# Patient Record
Sex: MALE | Race: WHITE | NOT HISPANIC OR LATINO | Employment: FULL TIME | URBAN - METROPOLITAN AREA
[De-identification: names, ages, dates, MRNs, and addresses within clinical notes are randomized per-mention and may not be internally consistent; named-entity substitution may affect disease eponyms.]

---

## 2019-02-15 ENCOUNTER — OFFICE VISIT - HEALTHEAST (OUTPATIENT)
Dept: FAMILY MEDICINE | Facility: CLINIC | Age: 49
End: 2019-02-15

## 2019-02-15 DIAGNOSIS — R27.8: ICD-10-CM

## 2019-02-15 DIAGNOSIS — R79.89 INCREASED AMMONIA LEVEL: ICD-10-CM

## 2019-02-15 DIAGNOSIS — R03.0 ELEVATED BLOOD PRESSURE READING WITHOUT DIAGNOSIS OF HYPERTENSION: ICD-10-CM

## 2019-02-15 DIAGNOSIS — Z78.9 ALCOHOL USE: ICD-10-CM

## 2019-02-15 LAB
ALBUMIN SERPL-MCNC: 5.2 G/DL (ref 3.5–5)
ALP SERPL-CCNC: 70 U/L (ref 45–120)
ALT SERPL W P-5'-P-CCNC: 26 U/L (ref 0–45)
AMMONIA PLAS-SCNC: 46 UMOL/L (ref 11–35)
AMPHETAMINES UR QL SCN: NORMAL
ANION GAP SERPL CALCULATED.3IONS-SCNC: 17 MMOL/L (ref 5–18)
AST SERPL W P-5'-P-CCNC: 36 U/L (ref 0–40)
BARBITURATES UR QL: NORMAL
BASOPHILS # BLD AUTO: 0 THOU/UL (ref 0–0.2)
BASOPHILS NFR BLD AUTO: 1 % (ref 0–2)
BENZODIAZ UR QL: NORMAL
BILIRUB SERPL-MCNC: 3.2 MG/DL (ref 0–1)
BUN SERPL-MCNC: 15 MG/DL (ref 8–22)
CALCIUM SERPL-MCNC: 11.1 MG/DL (ref 8.5–10.5)
CANNABINOIDS UR QL SCN: NORMAL
CHLORIDE BLD-SCNC: 92 MMOL/L (ref 98–107)
CO2 SERPL-SCNC: 27 MMOL/L (ref 22–31)
COCAINE UR QL: NORMAL
CREAT SERPL-MCNC: 0.78 MG/DL (ref 0.7–1.3)
CREAT UR-MCNC: 242 MG/DL
EOSINOPHIL # BLD AUTO: 0.1 THOU/UL (ref 0–0.4)
EOSINOPHIL NFR BLD AUTO: 1 % (ref 0–6)
ERYTHROCYTE [DISTWIDTH] IN BLOOD BY AUTOMATED COUNT: 12.7 % (ref 11–14.5)
ETHANOL UR CFM-MCNC: <10 MG/DL
GFR SERPL CREATININE-BSD FRML MDRD: >60 ML/MIN/1.73M2
GGT SERPL-CCNC: 135 U/L (ref 0–50)
GLUCOSE BLD-MCNC: 149 MG/DL (ref 70–125)
HCT VFR BLD AUTO: 47.9 % (ref 40–54)
HGB BLD-MCNC: 16.1 G/DL (ref 14–18)
INR PPP: 0.99 (ref 0.9–1.1)
LYMPHOCYTES # BLD AUTO: 1.1 THOU/UL (ref 0.8–4.4)
LYMPHOCYTES NFR BLD AUTO: 13 % (ref 20–40)
MCH RBC QN AUTO: 31.3 PG (ref 27–34)
MCHC RBC AUTO-ENTMCNC: 33.7 G/DL (ref 32–36)
MCV RBC AUTO: 93 FL (ref 80–100)
METHADONE UR QL SCN: NORMAL
MONOCYTES # BLD AUTO: 0.9 THOU/UL (ref 0–0.9)
MONOCYTES NFR BLD AUTO: 11 % (ref 2–10)
NEUTROPHILS # BLD AUTO: 6.1 THOU/UL (ref 2–7.7)
NEUTROPHILS NFR BLD AUTO: 74 % (ref 50–70)
OPIATES UR QL SCN: NORMAL
OXYCODONE UR QL: NORMAL
PCP UR QL SCN: NORMAL
PLATELET # BLD AUTO: 193 THOU/UL (ref 140–440)
PMV BLD AUTO: 7.2 FL (ref 7–10)
POTASSIUM BLD-SCNC: 3.4 MMOL/L (ref 3.5–5)
PROT SERPL-MCNC: 8.3 G/DL (ref 6–8)
RBC # BLD AUTO: 5.15 MILL/UL (ref 4.4–6.2)
SODIUM SERPL-SCNC: 136 MMOL/L (ref 136–145)
TSH SERPL DL<=0.005 MIU/L-ACNC: 3.85 UIU/ML (ref 0.3–5)
WBC: 8.2 THOU/UL (ref 4–11)

## 2019-02-15 ASSESSMENT — MIFFLIN-ST. JEOR: SCORE: 1830.35

## 2019-02-18 ENCOUNTER — COMMUNICATION - HEALTHEAST (OUTPATIENT)
Dept: FAMILY MEDICINE | Facility: CLINIC | Age: 49
End: 2019-02-18

## 2019-02-20 ENCOUNTER — OFFICE VISIT - HEALTHEAST (OUTPATIENT)
Dept: ADDICTION MEDICINE | Facility: CLINIC | Age: 49
End: 2019-02-20

## 2019-02-20 DIAGNOSIS — F10.20 ALCOHOL USE DISORDER, SEVERE, DEPENDENCE (H): ICD-10-CM

## 2019-03-01 ENCOUNTER — OFFICE VISIT - HEALTHEAST (OUTPATIENT)
Dept: FAMILY MEDICINE | Facility: CLINIC | Age: 49
End: 2019-03-01

## 2019-03-01 ENCOUNTER — COMMUNICATION - HEALTHEAST (OUTPATIENT)
Dept: ADDICTION MEDICINE | Facility: CLINIC | Age: 49
End: 2019-03-01

## 2019-03-01 DIAGNOSIS — F10.20 MODERATE ALCOHOL USE DISORDER (H): ICD-10-CM

## 2019-03-01 DIAGNOSIS — G47.00 PERSISTENT INSOMNIA: ICD-10-CM

## 2019-03-01 ASSESSMENT — MIFFLIN-ST. JEOR: SCORE: 1843.43

## 2019-03-04 ENCOUNTER — COMMUNICATION - HEALTHEAST (OUTPATIENT)
Dept: FAMILY MEDICINE | Facility: CLINIC | Age: 49
End: 2019-03-04

## 2019-03-20 ENCOUNTER — COMMUNICATION - HEALTHEAST (OUTPATIENT)
Dept: FAMILY MEDICINE | Facility: CLINIC | Age: 49
End: 2019-03-20

## 2019-03-20 ENCOUNTER — TRANSFERRED RECORDS (OUTPATIENT)
Dept: HEALTH INFORMATION MANAGEMENT | Facility: CLINIC | Age: 49
End: 2019-03-20

## 2019-03-20 ENCOUNTER — RECORDS - HEALTHEAST (OUTPATIENT)
Dept: ADMINISTRATIVE | Facility: OTHER | Age: 49
End: 2019-03-20

## 2019-03-20 LAB — HEP C HIM: NORMAL

## 2019-03-22 ENCOUNTER — OFFICE VISIT - HEALTHEAST (OUTPATIENT)
Dept: FAMILY MEDICINE | Facility: CLINIC | Age: 49
End: 2019-03-22

## 2019-03-22 DIAGNOSIS — F10.20 MODERATE ALCOHOL USE DISORDER (H): ICD-10-CM

## 2019-03-22 DIAGNOSIS — R79.89 INCREASED AMMONIA LEVEL: ICD-10-CM

## 2019-03-22 DIAGNOSIS — R74.8 ABNORMAL GGT TEST: ICD-10-CM

## 2019-03-22 DIAGNOSIS — E83.52 CALCIUM BLOOD INCREASED: ICD-10-CM

## 2019-03-22 DIAGNOSIS — R17 INCREASED BILIRUBIN LEVEL: ICD-10-CM

## 2019-03-22 DIAGNOSIS — R73.09 ELEVATED GLUCOSE: ICD-10-CM

## 2019-05-07 ENCOUNTER — RECORDS - HEALTHEAST (OUTPATIENT)
Dept: ADMINISTRATIVE | Facility: OTHER | Age: 49
End: 2019-05-07

## 2019-05-07 ENCOUNTER — MEDICAL CORRESPONDENCE (OUTPATIENT)
Dept: HEALTH INFORMATION MANAGEMENT | Facility: CLINIC | Age: 49
End: 2019-05-07

## 2019-05-07 ENCOUNTER — TRANSFERRED RECORDS (OUTPATIENT)
Dept: HEALTH INFORMATION MANAGEMENT | Facility: CLINIC | Age: 49
End: 2019-05-07

## 2019-05-08 ENCOUNTER — TRANSFERRED RECORDS (OUTPATIENT)
Dept: HEALTH INFORMATION MANAGEMENT | Facility: CLINIC | Age: 49
End: 2019-05-08

## 2019-06-13 ENCOUNTER — TRANSFERRED RECORDS (OUTPATIENT)
Dept: HEALTH INFORMATION MANAGEMENT | Facility: CLINIC | Age: 49
End: 2019-06-13

## 2019-06-13 ENCOUNTER — RECORDS - HEALTHEAST (OUTPATIENT)
Dept: ADMINISTRATIVE | Facility: OTHER | Age: 49
End: 2019-06-13

## 2019-06-17 ENCOUNTER — RECORDS - HEALTHEAST (OUTPATIENT)
Dept: ADMINISTRATIVE | Facility: OTHER | Age: 49
End: 2019-06-17

## 2019-06-17 ENCOUNTER — TRANSFERRED RECORDS (OUTPATIENT)
Dept: HEALTH INFORMATION MANAGEMENT | Facility: CLINIC | Age: 49
End: 2019-06-17

## 2019-09-04 ENCOUNTER — OFFICE VISIT - HEALTHEAST (OUTPATIENT)
Dept: FAMILY MEDICINE | Facility: CLINIC | Age: 49
End: 2019-09-04

## 2019-09-04 DIAGNOSIS — Z12.5 SCREENING PSA (PROSTATE SPECIFIC ANTIGEN): ICD-10-CM

## 2019-09-04 DIAGNOSIS — E83.52 HYPERCALCEMIA: ICD-10-CM

## 2019-09-04 DIAGNOSIS — M77.9 TENDONITIS: ICD-10-CM

## 2019-09-04 DIAGNOSIS — R00.0 TACHYCARDIA: ICD-10-CM

## 2019-09-04 ASSESSMENT — MIFFLIN-ST. JEOR: SCORE: 1901.11

## 2019-09-04 ASSESSMENT — PATIENT HEALTH QUESTIONNAIRE - PHQ9: SUM OF ALL RESPONSES TO PHQ QUESTIONS 1-9: 5

## 2019-09-26 ENCOUNTER — TRANSFERRED RECORDS (OUTPATIENT)
Dept: HEALTH INFORMATION MANAGEMENT | Facility: CLINIC | Age: 49
End: 2019-09-26

## 2019-09-26 ENCOUNTER — RECORDS - HEALTHEAST (OUTPATIENT)
Dept: ADMINISTRATIVE | Facility: OTHER | Age: 49
End: 2019-09-26

## 2019-09-27 ENCOUNTER — TRANSFERRED RECORDS (OUTPATIENT)
Dept: HEALTH INFORMATION MANAGEMENT | Facility: CLINIC | Age: 49
End: 2019-09-27

## 2019-09-27 ENCOUNTER — RECORDS - HEALTHEAST (OUTPATIENT)
Dept: ADMINISTRATIVE | Facility: OTHER | Age: 49
End: 2019-09-27

## 2019-10-01 ENCOUNTER — TRANSFERRED RECORDS (OUTPATIENT)
Dept: HEALTH INFORMATION MANAGEMENT | Facility: CLINIC | Age: 49
End: 2019-10-01

## 2019-10-04 ENCOUNTER — COMMUNICATION - HEALTHEAST (OUTPATIENT)
Dept: LAB | Facility: CLINIC | Age: 49
End: 2019-10-04

## 2019-10-04 ENCOUNTER — AMBULATORY - HEALTHEAST (OUTPATIENT)
Dept: LAB | Facility: CLINIC | Age: 49
End: 2019-10-04

## 2019-10-04 DIAGNOSIS — Z12.5 SCREENING PSA (PROSTATE SPECIFIC ANTIGEN): ICD-10-CM

## 2019-10-04 DIAGNOSIS — R79.89 INCREASED AMMONIA LEVEL: ICD-10-CM

## 2019-10-04 DIAGNOSIS — M77.9 TENDONITIS: ICD-10-CM

## 2019-10-04 DIAGNOSIS — E83.52 HYPERCALCEMIA: ICD-10-CM

## 2019-10-04 DIAGNOSIS — R00.0 TACHYCARDIA: ICD-10-CM

## 2019-10-04 LAB
ALBUMIN SERPL-MCNC: 4.5 G/DL (ref 3.5–5)
ALP SERPL-CCNC: 66 U/L (ref 45–120)
ALT SERPL W P-5'-P-CCNC: 18 U/L (ref 0–45)
ANION GAP SERPL CALCULATED.3IONS-SCNC: 9 MMOL/L (ref 5–18)
AST SERPL W P-5'-P-CCNC: 15 U/L (ref 0–40)
BILIRUB SERPL-MCNC: 0.5 MG/DL (ref 0–1)
BUN SERPL-MCNC: 13 MG/DL (ref 8–22)
C REACTIVE PROTEIN LHE: 0.5 MG/DL (ref 0–0.8)
CALCIUM SERPL-MCNC: 10.3 MG/DL (ref 8.5–10.5)
CALCIUM, IONIZED MEASURED: 1.28 MMOL/L (ref 1.11–1.3)
CHLORIDE BLD-SCNC: 107 MMOL/L (ref 98–107)
CO2 SERPL-SCNC: 28 MMOL/L (ref 22–31)
CREAT SERPL-MCNC: 0.75 MG/DL (ref 0.7–1.3)
ERYTHROCYTE [SEDIMENTATION RATE] IN BLOOD BY WESTERGREN METHOD: 18 MM/HR (ref 0–15)
GFR SERPL CREATININE-BSD FRML MDRD: >60 ML/MIN/1.73M2
GLUCOSE BLD-MCNC: 90 MG/DL (ref 70–125)
ION CA PH 7.4: 1.22 MMOL/L (ref 1.11–1.3)
IRON SERPL-MCNC: 84 UG/DL (ref 42–175)
PH: 7.29 (ref 7.35–7.45)
POTASSIUM BLD-SCNC: 4.1 MMOL/L (ref 3.5–5)
PROT SERPL-MCNC: 7.3 G/DL (ref 6–8)
PSA SERPL-MCNC: 0.4 NG/ML (ref 0–3.5)
RHEUMATOID FACT SERPL-ACNC: <15 IU/ML (ref 0–30)
SODIUM SERPL-SCNC: 144 MMOL/L (ref 136–145)
TSH SERPL DL<=0.005 MIU/L-ACNC: 2.31 UIU/ML (ref 0.3–5)
URATE SERPL-MCNC: 6.5 MG/DL (ref 3–8)

## 2019-10-05 ENCOUNTER — COMMUNICATION - HEALTHEAST (OUTPATIENT)
Dept: FAMILY MEDICINE | Facility: CLINIC | Age: 49
End: 2019-10-05

## 2019-10-07 ENCOUNTER — COMMUNICATION - HEALTHEAST (OUTPATIENT)
Dept: FAMILY MEDICINE | Facility: CLINIC | Age: 49
End: 2019-10-07

## 2019-10-14 ENCOUNTER — COMMUNICATION - HEALTHEAST (OUTPATIENT)
Dept: FAMILY MEDICINE | Facility: CLINIC | Age: 49
End: 2019-10-14

## 2019-10-14 DIAGNOSIS — M79.643 PAIN OF HAND, UNSPECIFIED LATERALITY: ICD-10-CM

## 2019-10-21 NOTE — TELEPHONE ENCOUNTER
Pain of hand/Dr DIAZ/IMAGES????[facility]/ortho con-HE Referral    RECORDS RECEIVED FROM: Internal    DATE RECEIVED: 10/30/19   NOTES STATUS DETAILS   OFFICE NOTE from referring provider Internal 10/14/19 Caroline Diaz MD   OFFICE NOTE from other specialist N/A    DISCHARGE SUMMARY from hospital N/A    DISCHARGE REPORT from the ER N/A    OPERATIVE REPORT N/A    MEDICATION LIST N/A    IMPLANT RECORD/STICKER N/A    LABS     CBC/DIFF Internal 11/29/10   CULTURES N/A    INJECTIONS DONE IN RADIOLOGY N/A    MRI N/A    CT SCAN N/A    XRAYS (IMAGES & REPORTS) N/A    TUMOR     PATHOLOGY  Slides & report N/A      Action 10.21.19   Action [facility]       10.28.19 MJ  2:06 PM  Care Everywhere has been quarried and no images are listed from [facility]. No missing images.

## 2019-10-25 DIAGNOSIS — M79.643 PAIN OF HAND, UNSPECIFIED LATERALITY: Primary | ICD-10-CM

## 2019-10-30 ENCOUNTER — PRE VISIT (OUTPATIENT)
Dept: ORTHOPEDICS | Facility: CLINIC | Age: 49
End: 2019-10-30

## 2020-09-17 ENCOUNTER — APPOINTMENT (OUTPATIENT)
Dept: GENERAL RADIOLOGY | Facility: CLINIC | Age: 50
DRG: 083 | End: 2020-09-17
Attending: EMERGENCY MEDICINE
Payer: COMMERCIAL

## 2020-09-17 ENCOUNTER — APPOINTMENT (OUTPATIENT)
Dept: CT IMAGING | Facility: CLINIC | Age: 50
DRG: 083 | End: 2020-09-17
Attending: EMERGENCY MEDICINE
Payer: COMMERCIAL

## 2020-09-17 ENCOUNTER — HOSPITAL ENCOUNTER (INPATIENT)
Facility: CLINIC | Age: 50
LOS: 1 days | Discharge: HOME | DRG: 083 | End: 2020-09-18
Attending: EMERGENCY MEDICINE | Admitting: SURGERY
Payer: COMMERCIAL

## 2020-09-17 ENCOUNTER — APPOINTMENT (OUTPATIENT)
Dept: CT IMAGING | Facility: CLINIC | Age: 50
DRG: 083 | End: 2020-09-17
Attending: NURSE PRACTITIONER
Payer: COMMERCIAL

## 2020-09-17 DIAGNOSIS — Z20.828 EXPOSURE TO SARS-ASSOCIATED CORONAVIRUS: ICD-10-CM

## 2020-09-17 DIAGNOSIS — S06.5XAA SDH (SUBDURAL HEMATOMA) (H): Primary | ICD-10-CM

## 2020-09-17 DIAGNOSIS — I62.00 SUBDURAL HEMORRHAGE (H): ICD-10-CM

## 2020-09-17 DIAGNOSIS — S09.90XA CLOSED HEAD INJURY, INITIAL ENCOUNTER: ICD-10-CM

## 2020-09-17 DIAGNOSIS — S06.5XAA SUBDURAL HEMATOMA (H): Primary | ICD-10-CM

## 2020-09-17 DIAGNOSIS — F10.929 ALCOHOLIC INTOXICATION WITH COMPLICATION (H): ICD-10-CM

## 2020-09-17 LAB
ALBUMIN SERPL-MCNC: 3.9 G/DL (ref 3.4–5)
ALP SERPL-CCNC: 81 U/L (ref 40–150)
ALT SERPL W P-5'-P-CCNC: 58 U/L (ref 0–70)
AMMONIA PLAS-SCNC: 32 UMOL/L (ref 10–50)
ANION GAP SERPL CALCULATED.3IONS-SCNC: 6 MMOL/L (ref 3–14)
APAP SERPL-MCNC: <2 MG/L (ref 10–20)
AST SERPL W P-5'-P-CCNC: 72 U/L (ref 0–45)
BASOPHILS # BLD AUTO: 0.1 10E9/L (ref 0–0.2)
BASOPHILS NFR BLD AUTO: 1.1 %
BILIRUB SERPL-MCNC: 0.4 MG/DL (ref 0.2–1.3)
BUN SERPL-MCNC: 4 MG/DL (ref 7–30)
CALCIUM SERPL-MCNC: 8.7 MG/DL (ref 8.5–10.1)
CHLORIDE SERPL-SCNC: 112 MMOL/L (ref 94–109)
CO2 SERPL-SCNC: 26 MMOL/L (ref 20–32)
CREAT SERPL-MCNC: 0.7 MG/DL (ref 0.66–1.25)
DIFFERENTIAL METHOD BLD: NORMAL
EOSINOPHIL # BLD AUTO: 0.2 10E9/L (ref 0–0.7)
EOSINOPHIL NFR BLD AUTO: 3.5 %
ERYTHROCYTE [DISTWIDTH] IN BLOOD BY AUTOMATED COUNT: 13.1 % (ref 10–15)
ETHANOL SERPL-MCNC: 0.41 G/DL
GFR SERPL CREATININE-BSD FRML MDRD: 67 ML/MIN/{1.73_M2}
GLUCOSE BLDC GLUCOMTR-MCNC: 110 MG/DL (ref 70–99)
GLUCOSE BLDC GLUCOMTR-MCNC: 138 MG/DL (ref 70–99)
GLUCOSE BLDC GLUCOMTR-MCNC: 157 MG/DL (ref 70–99)
GLUCOSE SERPL-MCNC: 120 MG/DL (ref 70–99)
HCT VFR BLD AUTO: 45.6 % (ref 40–53)
HGB BLD-MCNC: 15.4 G/DL (ref 13.3–17.7)
IMM GRANULOCYTES # BLD: 0 10E9/L (ref 0–0.4)
IMM GRANULOCYTES NFR BLD: 0.4 %
INR PPP: 1.1 (ref 0.86–1.14)
INTERPRETATION ECG - MUSE: NORMAL
LYMPHOCYTES # BLD AUTO: 3.1 10E9/L (ref 0.8–5.3)
LYMPHOCYTES NFR BLD AUTO: 56.5 %
MAGNESIUM SERPL-MCNC: 2.3 MG/DL (ref 1.6–2.3)
MCH RBC QN AUTO: 30.4 PG (ref 26.5–33)
MCHC RBC AUTO-ENTMCNC: 33.8 G/DL (ref 31.5–36.5)
MCV RBC AUTO: 90 FL (ref 78–100)
MONOCYTES # BLD AUTO: 0.5 10E9/L (ref 0–1.3)
MONOCYTES NFR BLD AUTO: 9.3 %
NEUTROPHILS # BLD AUTO: 1.6 10E9/L (ref 1.6–8.3)
NEUTROPHILS NFR BLD AUTO: 29.2 %
NRBC # BLD AUTO: 0 10*3/UL
NRBC BLD AUTO-RTO: 0 /100
PHOSPHATE SERPL-MCNC: 1.8 MG/DL (ref 2.5–4.5)
PLATELET # BLD AUTO: 202 10E9/L (ref 150–450)
POTASSIUM SERPL-SCNC: 3.1 MMOL/L (ref 3.4–5.3)
PROT SERPL-MCNC: 7.8 G/DL (ref 6.8–8.8)
RBC # BLD AUTO: 5.06 10E12/L (ref 4.4–5.9)
SALICYLATES SERPL-MCNC: <2 MG/DL
SODIUM SERPL-SCNC: 145 MMOL/L (ref 133–144)
TSH SERPL DL<=0.005 MIU/L-ACNC: 2.39 MU/L (ref 0.4–4)
WBC # BLD AUTO: 5.5 10E9/L (ref 4–11)

## 2020-09-17 PROCEDURE — 80320 DRUG SCREEN QUANTALCOHOLS: CPT | Performed by: EMERGENCY MEDICINE

## 2020-09-17 PROCEDURE — 93005 ELECTROCARDIOGRAM TRACING: CPT | Performed by: EMERGENCY MEDICINE

## 2020-09-17 PROCEDURE — U0003 INFECTIOUS AGENT DETECTION BY NUCLEIC ACID (DNA OR RNA); SEVERE ACUTE RESPIRATORY SYNDROME CORONAVIRUS 2 (SARS-COV-2) (CORONAVIRUS DISEASE [COVID-19]), AMPLIFIED PROBE TECHNIQUE, MAKING USE OF HIGH THROUGHPUT TECHNOLOGIES AS DESCRIBED BY CMS-2020-01-R: HCPCS | Performed by: EMERGENCY MEDICINE

## 2020-09-17 PROCEDURE — 70450 CT HEAD/BRAIN W/O DYE: CPT

## 2020-09-17 PROCEDURE — 70450 CT HEAD/BRAIN W/O DYE: CPT | Mod: 76

## 2020-09-17 PROCEDURE — 72125 CT NECK SPINE W/O DYE: CPT

## 2020-09-17 PROCEDURE — 83735 ASSAY OF MAGNESIUM: CPT | Performed by: EMERGENCY MEDICINE

## 2020-09-17 PROCEDURE — 80329 ANALGESICS NON-OPIOID 1 OR 2: CPT | Performed by: EMERGENCY MEDICINE

## 2020-09-17 PROCEDURE — 85025 COMPLETE CBC W/AUTO DIFF WBC: CPT | Performed by: EMERGENCY MEDICINE

## 2020-09-17 PROCEDURE — 25000128 H RX IP 250 OP 636: Performed by: NURSE PRACTITIONER

## 2020-09-17 PROCEDURE — 25000125 ZZHC RX 250: Performed by: EMERGENCY MEDICINE

## 2020-09-17 PROCEDURE — 85610 PROTHROMBIN TIME: CPT | Performed by: EMERGENCY MEDICINE

## 2020-09-17 PROCEDURE — 84100 ASSAY OF PHOSPHORUS: CPT | Performed by: EMERGENCY MEDICINE

## 2020-09-17 PROCEDURE — 25000132 ZZH RX MED GY IP 250 OP 250 PS 637: Performed by: NURSE PRACTITIONER

## 2020-09-17 PROCEDURE — 93010 ELECTROCARDIOGRAM REPORT: CPT | Mod: Z6 | Performed by: EMERGENCY MEDICINE

## 2020-09-17 PROCEDURE — 99285 EMERGENCY DEPT VISIT HI MDM: CPT | Mod: 25 | Performed by: EMERGENCY MEDICINE

## 2020-09-17 PROCEDURE — 80053 COMPREHEN METABOLIC PANEL: CPT | Performed by: EMERGENCY MEDICINE

## 2020-09-17 PROCEDURE — 71045 X-RAY EXAM CHEST 1 VIEW: CPT

## 2020-09-17 PROCEDURE — C9803 HOPD COVID-19 SPEC COLLECT: HCPCS | Performed by: EMERGENCY MEDICINE

## 2020-09-17 PROCEDURE — 82140 ASSAY OF AMMONIA: CPT | Performed by: EMERGENCY MEDICINE

## 2020-09-17 PROCEDURE — 25800030 ZZH RX IP 258 OP 636: Performed by: NURSE PRACTITIONER

## 2020-09-17 PROCEDURE — 84443 ASSAY THYROID STIM HORMONE: CPT | Performed by: EMERGENCY MEDICINE

## 2020-09-17 PROCEDURE — 20000004 ZZH R&B ICU UMMC

## 2020-09-17 PROCEDURE — 25000125 ZZHC RX 250: Performed by: NURSE PRACTITIONER

## 2020-09-17 PROCEDURE — 00000146 ZZHCL STATISTIC GLUCOSE BY METER IP

## 2020-09-17 PROCEDURE — 99291 CRITICAL CARE FIRST HOUR: CPT | Mod: 25 | Performed by: EMERGENCY MEDICINE

## 2020-09-17 PROCEDURE — HZ2ZZZZ DETOXIFICATION SERVICES FOR SUBSTANCE ABUSE TREATMENT: ICD-10-PCS | Performed by: SURGERY

## 2020-09-17 PROCEDURE — 68200002 ZZH TRAUMA EVALUATION W/O CC LEVEL II: Performed by: EMERGENCY MEDICINE

## 2020-09-17 PROCEDURE — 96374 THER/PROPH/DIAG INJ IV PUSH: CPT | Performed by: EMERGENCY MEDICINE

## 2020-09-17 PROCEDURE — 40000141 ZZH STATISTIC PERIPHERAL IV START W/O US GUIDANCE

## 2020-09-17 PROCEDURE — 99233 SBSQ HOSP IP/OBS HIGH 50: CPT | Mod: GC | Performed by: ANESTHESIOLOGY

## 2020-09-17 RX ORDER — GABAPENTIN 300 MG/1
600 CAPSULE ORAL EVERY 8 HOURS
Status: DISCONTINUED | OUTPATIENT
Start: 2020-09-20 | End: 2020-09-18 | Stop reason: HOSPADM

## 2020-09-17 RX ORDER — FOLIC ACID 5 MG/ML
1 INJECTION, SOLUTION INTRAMUSCULAR; INTRAVENOUS; SUBCUTANEOUS ONCE
Status: COMPLETED | OUTPATIENT
Start: 2020-09-17 | End: 2020-09-17

## 2020-09-17 RX ORDER — SODIUM CHLORIDE, SODIUM LACTATE, POTASSIUM CHLORIDE, CALCIUM CHLORIDE 600; 310; 30; 20 MG/100ML; MG/100ML; MG/100ML; MG/100ML
INJECTION, SOLUTION INTRAVENOUS CONTINUOUS
Status: DISCONTINUED | OUTPATIENT
Start: 2020-09-17 | End: 2020-09-18 | Stop reason: HOSPADM

## 2020-09-17 RX ORDER — POTASSIUM CL/LIDO/0.9 % NACL 10MEQ/0.1L
10 INTRAVENOUS SOLUTION, PIGGYBACK (ML) INTRAVENOUS
Status: DISCONTINUED | OUTPATIENT
Start: 2020-09-17 | End: 2020-09-18 | Stop reason: HOSPADM

## 2020-09-17 RX ORDER — GABAPENTIN 300 MG/1
900 CAPSULE ORAL EVERY 8 HOURS
Status: DISCONTINUED | OUTPATIENT
Start: 2020-09-17 | End: 2020-09-18 | Stop reason: HOSPADM

## 2020-09-17 RX ORDER — METOCLOPRAMIDE 5 MG/1
5 TABLET ORAL EVERY 6 HOURS PRN
Status: DISCONTINUED | OUTPATIENT
Start: 2020-09-17 | End: 2020-09-18 | Stop reason: HOSPADM

## 2020-09-17 RX ORDER — POTASSIUM CHLORIDE 7.45 MG/ML
10 INJECTION INTRAVENOUS
Status: DISCONTINUED | OUTPATIENT
Start: 2020-09-17 | End: 2020-09-18 | Stop reason: HOSPADM

## 2020-09-17 RX ORDER — LABETALOL 20 MG/4 ML (5 MG/ML) INTRAVENOUS SYRINGE
10-20 EVERY 4 HOURS
Status: DISCONTINUED | OUTPATIENT
Start: 2020-09-17 | End: 2020-09-17

## 2020-09-17 RX ORDER — FOLIC ACID 5 MG/ML
1 INJECTION, SOLUTION INTRAMUSCULAR; INTRAVENOUS; SUBCUTANEOUS DAILY
Status: DISCONTINUED | OUTPATIENT
Start: 2020-09-18 | End: 2020-09-18 | Stop reason: HOSPADM

## 2020-09-17 RX ORDER — BISACODYL 10 MG
10 SUPPOSITORY, RECTAL RECTAL DAILY PRN
Status: DISCONTINUED | OUTPATIENT
Start: 2020-09-17 | End: 2020-09-18 | Stop reason: HOSPADM

## 2020-09-17 RX ORDER — AMOXICILLIN 250 MG
2 CAPSULE ORAL 2 TIMES DAILY PRN
Status: DISCONTINUED | OUTPATIENT
Start: 2020-09-17 | End: 2020-09-18 | Stop reason: HOSPADM

## 2020-09-17 RX ORDER — SODIUM CHLORIDE, SODIUM LACTATE, POTASSIUM CHLORIDE, CALCIUM CHLORIDE 600; 310; 30; 20 MG/100ML; MG/100ML; MG/100ML; MG/100ML
INJECTION, SOLUTION INTRAVENOUS CONTINUOUS
Status: DISCONTINUED | OUTPATIENT
Start: 2020-09-17 | End: 2020-09-17

## 2020-09-17 RX ORDER — AMOXICILLIN 250 MG
1 CAPSULE ORAL 2 TIMES DAILY PRN
Status: DISCONTINUED | OUTPATIENT
Start: 2020-09-17 | End: 2020-09-18 | Stop reason: HOSPADM

## 2020-09-17 RX ORDER — LANOLIN ALCOHOL/MO/W.PET/CERES
100 CREAM (GRAM) TOPICAL 3 TIMES DAILY
Status: DISCONTINUED | OUTPATIENT
Start: 2020-09-19 | End: 2020-09-18 | Stop reason: HOSPADM

## 2020-09-17 RX ORDER — NALOXONE HYDROCHLORIDE 0.4 MG/ML
.1-.4 INJECTION, SOLUTION INTRAMUSCULAR; INTRAVENOUS; SUBCUTANEOUS
Status: DISCONTINUED | OUTPATIENT
Start: 2020-09-17 | End: 2020-09-18 | Stop reason: HOSPADM

## 2020-09-17 RX ORDER — METOCLOPRAMIDE HYDROCHLORIDE 5 MG/ML
5 INJECTION INTRAMUSCULAR; INTRAVENOUS EVERY 6 HOURS PRN
Status: DISCONTINUED | OUTPATIENT
Start: 2020-09-17 | End: 2020-09-18 | Stop reason: HOSPADM

## 2020-09-17 RX ORDER — LEVETIRACETAM 5 MG/ML
500 INJECTION INTRAVASCULAR EVERY 12 HOURS
Status: DISCONTINUED | OUTPATIENT
Start: 2020-09-17 | End: 2020-09-18

## 2020-09-17 RX ORDER — ONDANSETRON 4 MG/1
4 TABLET, ORALLY DISINTEGRATING ORAL EVERY 6 HOURS PRN
Status: DISCONTINUED | OUTPATIENT
Start: 2020-09-17 | End: 2020-09-18 | Stop reason: HOSPADM

## 2020-09-17 RX ORDER — PROCHLORPERAZINE 25 MG
12.5 SUPPOSITORY, RECTAL RECTAL EVERY 12 HOURS PRN
Status: DISCONTINUED | OUTPATIENT
Start: 2020-09-17 | End: 2020-09-18 | Stop reason: HOSPADM

## 2020-09-17 RX ORDER — ACETAMINOPHEN 650 MG/1
650 SUPPOSITORY RECTAL EVERY 4 HOURS PRN
Status: DISCONTINUED | OUTPATIENT
Start: 2020-09-17 | End: 2020-09-18 | Stop reason: HOSPADM

## 2020-09-17 RX ORDER — LANOLIN ALCOHOL/MO/W.PET/CERES
100 CREAM (GRAM) TOPICAL DAILY
Status: DISCONTINUED | OUTPATIENT
Start: 2020-09-24 | End: 2020-09-18 | Stop reason: HOSPADM

## 2020-09-17 RX ORDER — FLUMAZENIL 0.1 MG/ML
0.2 INJECTION, SOLUTION INTRAVENOUS
Status: DISCONTINUED | OUTPATIENT
Start: 2020-09-17 | End: 2020-09-17

## 2020-09-17 RX ORDER — GABAPENTIN 100 MG/1
100 CAPSULE ORAL EVERY 8 HOURS
Status: DISCONTINUED | OUTPATIENT
Start: 2020-09-24 | End: 2020-09-18 | Stop reason: HOSPADM

## 2020-09-17 RX ORDER — MULTIPLE VITAMINS W/ MINERALS TAB 9MG-400MCG
1 TAB ORAL DAILY
Status: DISCONTINUED | OUTPATIENT
Start: 2020-09-17 | End: 2020-09-18 | Stop reason: HOSPADM

## 2020-09-17 RX ORDER — ONDANSETRON 2 MG/ML
4 INJECTION INTRAMUSCULAR; INTRAVENOUS EVERY 6 HOURS PRN
Status: DISCONTINUED | OUTPATIENT
Start: 2020-09-17 | End: 2020-09-18 | Stop reason: HOSPADM

## 2020-09-17 RX ORDER — CLONIDINE HYDROCHLORIDE 0.1 MG/1
0.1 TABLET ORAL EVERY 8 HOURS
Status: DISCONTINUED | OUTPATIENT
Start: 2020-09-17 | End: 2020-09-18 | Stop reason: HOSPADM

## 2020-09-17 RX ORDER — DIAZEPAM 5 MG
10 TABLET ORAL EVERY 30 MIN PRN
Status: DISCONTINUED | OUTPATIENT
Start: 2020-09-17 | End: 2020-09-17

## 2020-09-17 RX ORDER — FOLIC ACID 1 MG/1
1 TABLET ORAL DAILY
Status: DISCONTINUED | OUTPATIENT
Start: 2020-09-20 | End: 2020-09-18 | Stop reason: HOSPADM

## 2020-09-17 RX ORDER — POTASSIUM CHLORIDE 1.5 G/1.58G
20-40 POWDER, FOR SOLUTION ORAL
Status: DISCONTINUED | OUTPATIENT
Start: 2020-09-17 | End: 2020-09-18 | Stop reason: HOSPADM

## 2020-09-17 RX ORDER — ACETAMINOPHEN 325 MG/1
650 TABLET ORAL EVERY 4 HOURS PRN
Status: DISCONTINUED | OUTPATIENT
Start: 2020-09-17 | End: 2020-09-18 | Stop reason: HOSPADM

## 2020-09-17 RX ORDER — HALOPERIDOL 5 MG/ML
2.5-5 INJECTION INTRAMUSCULAR EVERY 4 HOURS PRN
Status: DISCONTINUED | OUTPATIENT
Start: 2020-09-17 | End: 2020-09-18 | Stop reason: HOSPADM

## 2020-09-17 RX ORDER — OXYCODONE HYDROCHLORIDE 5 MG/1
5 TABLET ORAL EVERY 4 HOURS PRN
Status: DISCONTINUED | OUTPATIENT
Start: 2020-09-17 | End: 2020-09-18 | Stop reason: HOSPADM

## 2020-09-17 RX ORDER — GABAPENTIN 300 MG/1
300 CAPSULE ORAL EVERY 8 HOURS
Status: DISCONTINUED | OUTPATIENT
Start: 2020-09-22 | End: 2020-09-18 | Stop reason: HOSPADM

## 2020-09-17 RX ORDER — MAGNESIUM SULFATE HEPTAHYDRATE 40 MG/ML
4 INJECTION, SOLUTION INTRAVENOUS EVERY 4 HOURS PRN
Status: DISCONTINUED | OUTPATIENT
Start: 2020-09-17 | End: 2020-09-18 | Stop reason: HOSPADM

## 2020-09-17 RX ORDER — POTASSIUM CHLORIDE 750 MG/1
20-40 TABLET, EXTENDED RELEASE ORAL
Status: DISCONTINUED | OUTPATIENT
Start: 2020-09-17 | End: 2020-09-18 | Stop reason: HOSPADM

## 2020-09-17 RX ORDER — DIAZEPAM 10 MG/2ML
5-10 INJECTION, SOLUTION INTRAMUSCULAR; INTRAVENOUS EVERY 30 MIN PRN
Status: DISCONTINUED | OUTPATIENT
Start: 2020-09-17 | End: 2020-09-17

## 2020-09-17 RX ORDER — PROCHLORPERAZINE MALEATE 5 MG
5 TABLET ORAL EVERY 6 HOURS PRN
Status: DISCONTINUED | OUTPATIENT
Start: 2020-09-17 | End: 2020-09-18 | Stop reason: HOSPADM

## 2020-09-17 RX ORDER — GABAPENTIN 600 MG/1
1200 TABLET ORAL ONCE
Status: COMPLETED | OUTPATIENT
Start: 2020-09-17 | End: 2020-09-17

## 2020-09-17 RX ORDER — POTASSIUM CHLORIDE 29.8 MG/ML
20 INJECTION INTRAVENOUS
Status: DISCONTINUED | OUTPATIENT
Start: 2020-09-17 | End: 2020-09-18 | Stop reason: HOSPADM

## 2020-09-17 RX ORDER — LABETALOL 20 MG/4 ML (5 MG/ML) INTRAVENOUS SYRINGE
10-20 EVERY 4 HOURS PRN
Status: DISCONTINUED | OUTPATIENT
Start: 2020-09-17 | End: 2020-09-18 | Stop reason: HOSPADM

## 2020-09-17 RX ADMIN — GABAPENTIN 1200 MG: 600 TABLET, FILM COATED ORAL at 18:45

## 2020-09-17 RX ADMIN — LEVETIRACETAM 500 MG: 5 INJECTION INTRAVENOUS at 17:22

## 2020-09-17 RX ADMIN — CLONIDINE HYDROCHLORIDE 0.1 MG: 0.1 TABLET ORAL at 19:42

## 2020-09-17 RX ADMIN — Medication 100 MG: at 13:00

## 2020-09-17 RX ADMIN — MULTIPLE VITAMINS W/ MINERALS TAB 1 TABLET: TAB at 18:45

## 2020-09-17 RX ADMIN — GABAPENTIN 900 MG: 300 CAPSULE ORAL at 23:51

## 2020-09-17 RX ADMIN — FOLIC ACID 1 MG: 5 INJECTION, SOLUTION INTRAMUSCULAR; INTRAVENOUS; SUBCUTANEOUS at 18:45

## 2020-09-17 RX ADMIN — POTASSIUM PHOSPHATE, MONOBASIC AND POTASSIUM PHOSPHATE, DIBASIC 15 MMOL: 224; 236 INJECTION, SOLUTION INTRAVENOUS at 17:55

## 2020-09-17 RX ADMIN — THIAMINE HYDROCHLORIDE 200 MG: 100 INJECTION, SOLUTION INTRAMUSCULAR; INTRAVENOUS at 19:42

## 2020-09-17 RX ADMIN — SODIUM CHLORIDE, POTASSIUM CHLORIDE, SODIUM LACTATE AND CALCIUM CHLORIDE: 600; 310; 30; 20 INJECTION, SOLUTION INTRAVENOUS at 17:22

## 2020-09-17 ASSESSMENT — VISUAL ACUITY
OU: NORMAL ACUITY
OU: NORMAL ACUITY

## 2020-09-17 ASSESSMENT — ACTIVITIES OF DAILY LIVING (ADL): ADLS_ACUITY_SCORE: 20

## 2020-09-17 ASSESSMENT — PAIN DESCRIPTION - DESCRIPTORS: DESCRIPTORS: ACHING

## 2020-09-17 NOTE — H&P
[facility]  Trauma Service Progress Note  [facility]    History and Physical / Consult note: Trauma Service    Date of Admission:  9/17/2020  Trauma Mechanism:found down at work  Time of Admission/Consult Request (page/call): 09/17 @ 13:57    Time of my evaluation: 1415  Consulting services:  Neurosurgery - Emergent consult (within 30 mins): Called by ED  Known Injuries:  Left-sided temporoparietal acute subdural hemorrhage measuring up to 7  mm                   Other diagnoses:  1. Acute alcohol intoxication    Neuro/Pain:  # Left side SDH  # Acute alcohol withdrawal  # Encephalopathy possibly secondary to 1 and 2 above  Seen by Neurosurgery in the ED, no immediate surgical intervention planned.  1. Obtain a repeat CT head 4 hours form the initial  2. Obtain CT C spine to complete trauma imaging  3. Hold all chemical anticoagulation  4. Keppra for antiepileptic prophylaxis- ordered  5. Hourly Neurochecks  6. Goal SBP <140, PRN labetalol ordered  7. Minimize risk for secondary brain trauma: Normothermia, supplemental oxygen as needed, seizure prophylaxis as above, normoglycemia.  ETOH abuse care:  [facility] protocol ordered  - Monitor neurological status. Delirium prevention and precautions.     Pulmonary:  Supplemental oxygen to keep saturation above 92 %.  Incentive spirometer while awake     Cardiovascular:    No known PMH  PRN labetalol ordered for SBP >140  - Monitor hemodynamic status.     GI/Nutrition:    NPO for now given altered mental status  - MIVF ordered    Fluids/Electrolytes/ Renal  # Hypokalemia  - Replacement ordered.  - Add on Mag and phos levels  MIVF ordered while NPO for IV fluid hydration.   - electrolyte replacement protocol  In place.   - Monitor I/O  Endocrine:  - No management indication.   Infectious disease:   No indications for antibiotics.     Hematology:    - Hemoglobin 15.4 Monitor and trend.   - Obtain INR-  ordered   Threshold for transfusion if hgb <7.0 or signs/symptoms of hypoperfusion.       Musculoskeletal:    - Physical and occupational therapy consults when appropraite    Lines/ tubes/ drains:  PIV    General Cares:    PPI/H2 blocker: n /a   DVT prophylaxis: mechanical   Bowel Regimen/Date of last stool: unknown, PTA   ETOH screen completed BAL 0.41 on admission              Frailty Score: ERIC   Lines / drains: PIV    Code status:  Presumed full code until able to confirm with next of kin  Admit to SICU, discussed with SICU team    Interval History   Unable to complete ROS due AMS    ETOH: unable to complete 2/2 AMS.  BAL 0.41  Primary Care Physician   No primary care provider on file.    Chief Complaint   Head bleed    History is obtained from the electronic health record and emergency department physician    History of Present Illness   Naren De La Cruz is a 89 year old male who presented to the ED via EMS altered. Per chart review and per the ED provider he was found down at work. En route to the ED he was combative thus requiring a dose of Ketamine for CT head scan.   On exam he opened his eyes to voice, unable to provide any history.  He occasionally sat up in in bed, and was able to move all extremities.   Addendum @4407  Able to state his name, unsure what happened. Does not take any medications at home  Past Medical History    Unknown    Past Surgical History   unknown  Prior to Admission Medications   None     Allergies   Not on File    Social History   Social History     Socioeconomic History     Marital status: Not on file     Spouse name: Not on file     Number of children: Not on file     Years of education: Not on file     Highest education level: Not on file   Occupational History     Not on file   Social Needs     Financial resource strain: Not on file     Food insecurity     Worry: Not on file     Inability: Not on file     Transportation needs     Medical: Not on file     Non-medical:  Not on file   Tobacco Use     Smoking status: Not on file   Substance and Sexual Activity     Alcohol use: Not on file     Drug use: Not on file     Sexual activity: Not on file   Lifestyle     Physical activity     Days per week: Not on file     Minutes per session: Not on file     Stress: Not on file   Relationships     Social connections     Talks on phone: Not on file     Gets together: Not on file     Attends Worship service: Not on file     Active member of club or organization: Not on file     Attends meetings of clubs or organizations: Not on file     Relationship status: Not on file     Intimate partner violence     Fear of current or ex partner: Not on file     Emotionally abused: Not on file     Physically abused: Not on file     Forced sexual activity: Not on file   Other Topics Concern     Not on file   Social History Narrative     Not on file       Family History   Not reviewed    Review of Systems   Unable to complete due to AMS    Physical Exam   Temp: 98.1  F (36.7  C) Temp src: Oral BP: (!) 141/97 Pulse: 107   Resp: 17 SpO2: 96 % O2 Device: None (Room air)    Vital Signs with Ranges  Temp:  [98.1  F (36.7  C)] 98.1  F (36.7  C)  Pulse:  [] 107  Resp:  [14-22] 17  BP: (126-147)/() 141/97  SpO2:  [96 %-100 %] 96 % 206 lbs 0 oz    Primary Survey:  Airway: patient arouses to voice  Breathing: symmetric respiratory effort bilaterally  Circulation: central pulses present and peripheral pulses present  Disability: Pupils - left 4 mm and brisk, right 4 mm and brisk   [place] Coma Scale - Total 11/15 @14:15  Eye Response (E): 3  4= spontaneous,  3= to verbal/voice, 2=  to pain, 1= No response   Verbal Response (V): 3   5= Orientated, converses,  4= Confused, converses, 3= Inappropriate words,  2= Incomprehensible sounds,  1=No response   Motor Response (M): 5   6= Obeys commands, 5= Localizes to pain, 4= Withdrawal to pain, 3=Fexion to pain, 2= Extension to pain, 1= No response  Addendum @  1610  GCS 14, E==4, V= 4, M =5  Able to state his name: Ryder Conn  : 1970  Secondary Survey:  General: arouses to voice  Neuro: PERRLA. No focal deficits. Strength 5/5 x 4 extremities. Moves all extremities spont  Head: palpable right temp scalp hematoma, normocephalic, trachea midline  Eyes:  Pupils 4mm, EOMI,  Nose: nares patent, no drainage, nasal septum non-tender  Mouth/Throat: no exudates or erythema,  no dental tenderness or malocclusions, no tongue lacerations  Neck:  Aspen cervical collar present. No midline posterior tenderness, full AROM without pain.   Chest/Pulmonary: normal respiratory rate and rhythm,  bilateral clear breath sounds, no wheezes, rales or rhonchi, no chest wall tenderness, no external visible signs of trauma  Cardiovascular: S1, S2,  normal and regular rate and rhythm, no murmurs  Abdomen: soft, non-tender, no guarding, and no tenderness to palpation  : no urine to assess  Musculoskel/Extremities: full AROM of major joints without tenderness, edema, erythema, ecchymosis, or abrasions. +2 PP. no edema.   Back/Spine: no deformity, no midline tenderness, no sacral tenderness, no step-offs and no abrasions or contusions  Hands: no gross deformities of hands or fingers. Full AROM of hand and fingers in flexion and extension.  strength equal and symmetric.   Psychiatric: calm  Skin: no rashes, laceration, ecchymosis, skin warm and dry.     Results for orders placed or performed during the hospital encounter of 20 (from the past 24 hour(s))   CBC with platelets differential   Result Value Ref Range    WBC 5.5 4.0 - 11.0 10e9/L    RBC Count 5.06 4.4 - 5.9 10e12/L    Hemoglobin 15.4 13.3 - 17.7 g/dL    Hematocrit 45.6 40.0 - 53.0 %    MCV 90 78 - 100 fl    MCH 30.4 26.5 - 33.0 pg    MCHC 33.8 31.5 - 36.5 g/dL    RDW 13.1 10.0 - 15.0 %    Platelet Count 202 150 - 450 10e9/L    Diff Method Automated Method     % Neutrophils 29.2 %    % Lymphocytes 56.5 %    % Monocytes  9.3 %    % Eosinophils 3.5 %    % Basophils 1.1 %    % Immature Granulocytes 0.4 %    Nucleated RBCs 0 0 /100    Absolute Neutrophil 1.6 1.6 - 8.3 10e9/L    Absolute Lymphocytes 3.1 0.8 - 5.3 10e9/L    Absolute Monocytes 0.5 0.0 - 1.3 10e9/L    Absolute Eosinophils 0.2 0.0 - 0.7 10e9/L    Absolute Basophils 0.1 0.0 - 0.2 10e9/L    Abs Immature Granulocytes 0.0 0 - 0.4 10e9/L    Absolute Nucleated RBC 0.0    Comprehensive metabolic panel   Result Value Ref Range    Sodium 145 (H) 133 - 144 mmol/L    Potassium 3.1 (L) 3.4 - 5.3 mmol/L    Chloride 112 (H) 94 - 109 mmol/L    Carbon Dioxide 26 20 - 32 mmol/L    Anion Gap 6 3 - 14 mmol/L    Glucose 120 (H) 70 - 99 mg/dL    Urea Nitrogen 4 (L) 7 - 30 mg/dL    Creatinine 0.70 0.66 - 1.25 mg/dL    GFR Estimate 67 >60 mL/min/[1.73_m2]    GFR Estimate If Black 77 >60 mL/min/[1.73_m2]    Calcium 8.7 8.5 - 10.1 mg/dL    Bilirubin Total 0.4 0.2 - 1.3 mg/dL    Albumin 3.9 3.4 - 5.0 g/dL    Protein Total 7.8 6.8 - 8.8 g/dL    Alkaline Phosphatase 81 40 - 150 U/L    ALT 58 0 - 70 U/L    AST 72 (H) 0 - 45 U/L   TSH   Result Value Ref Range    TSH 2.39 0.40 - 4.00 mU/L   Alcohol level blood   Result Value Ref Range    Ethanol g/dL 0.41 (HH) <0.01 g/dL   Acetaminophen level   Result Value Ref Range    Acetaminophen Level <2 mg/L   Salicylate level   Result Value Ref Range    Salicylate Level <2 mg/dL   Ammonia   Result Value Ref Range    Ammonia 32 10 - 50 umol/L   Glucose by meter   Result Value Ref Range    Glucose 110 (H) 70 - 99 mg/dL   Magnesium   Result Value Ref Range    Magnesium 2.3 1.6 - 2.3 mg/dL   Phosphorus   Result Value Ref Range    Phosphorus 1.8 (L) 2.5 - 4.5 mg/dL   INR   Result Value Ref Range    INR 1.10 0.86 - 1.14   EKG 12-lead, tracing only   Result Value Ref Range    Interpretation ECG Click View Image link to view waveform and result    CT Head w/o Contrast    Narrative    CT HEAD W/O CONTRAST 9/17/2020 1:17 PM    History: altered mental status    ICD-10:    Comparison: None    Technique: Using multidetector thin collimation helical acquisition  technique, axial, coronal and sagittal CT images from the skull base  to the vertex were obtained without intravenous contrast.    Findings: There is a hyperdense subdural hemorrhage overlying the left  temporoparietal convexity that measures up to 7 mm. There is no  significant mass effect, or midline shift. Gray/white matter  differentiation in both cerebral hemispheres is preserved. Ventricles  are proportionate to the cerebral sulci. The basal cisterns are clear.    The bony calvaria and the bones of the skull base are normal. There is  a hematoma overlying the right frontoparietal scalp without evidence  of underlying osseous abnormality. The visualized portions of the  paranasal sinuses and mastoid air cells are clear.       Impression    Impression:  Left-sided temporoparietal acute subdural hemorrhage measuring up to 7  mm extending inferiorly to the tentorium. Right frontoparietal scalp  hematoma is also visualized suggesting a coup-contrecoup mechanism of  injury.     Acute findings were discussed with neuroradiology fellow and  communicated with ED provider at 1:19 PM and 9/17/2020    I have personally reviewed the examination and initial interpretation  and I agree with the findings.    MADAN ORTIZ MD   XR Chest Port 1 View    Narrative    Exam: XR CHEST PORT 1 VW, 9/17/2020 3:00 PM    Indication: altered mental status    Comparison: None    Findings:   Portable radiograph of the chest. Cardiac silhouette is not enlarged.  Streaky left basilar airspace opacities. No pneumothorax. No pleural  effusion. Upper abdomen is unremarkable. No acute osseous  abnormalities.      Impression    Impression: Subtle streaky left basilar airspace opacities, favor  atelectasis over infiltrate.     I have personally reviewed the examination and initial interpretation  and I agree with the findings.    DAVEY VAZQUEZ MD        Studies:  XR Chest Port 1 View   Final Result   Impression: Subtle streaky left basilar airspace opacities, favor   atelectasis over infiltrate.       I have personally reviewed the examination and initial interpretation   and I agree with the findings.      DAVEY VAZQUEZ MD      CT Head w/o Contrast   Final Result   Impression:   Left-sided temporoparietal acute subdural hemorrhage measuring up to 7   mm extending inferiorly to the tentorium. Right frontoparietal scalp   hematoma is also visualized suggesting a coup-contrecoup mechanism of   injury.       Acute findings were discussed with neuroradiology fellow and   communicated with ED provider at 1:19 PM and 9/17/2020      I have personally reviewed the examination and initial interpretation   and I agree with the findings.      MADAN ORTIZ MD      Head CT w/o contrast    (Results Pending)   CT Cervical Spine w/o Contrast    (Results Pending)       Candie Aceves CNP  Trauma service  Job code [id number]

## 2020-09-17 NOTE — ED NOTES
Pt's brother Kenyon called and would like to be pt's emergency contact per MD.  Kenyon hendrickson [phone].  Pt's brother reports pt's name is Ryder Hendrickson. Brother unsure if YOB: 1968 or 5/24/1969

## 2020-09-17 NOTE — H&P
SURGICAL ICU ADMISSION NOTE  9/17/2020      ASSESSMENT: Naren De La Cruz (Antwan Conn) is a 50 year old male with no PMH, found down at work and unresponsive, BIBA with altered mental status (combative) and alcoholic intoxication, found to have left temporoparietal acute subdural hemorrhage.     PLAN:   Neuro/ pain/ sedation:  # Left SDH   # Acute alcohol intoxication  #Altered mental status  -Monitor neurological status. Notify the MD/DO for any acute changes in exam.  - Hold all anticoagulation  - C-spine cleared  - Keppra 500 BID for seizure prophylaxis  - CIWA protocol (ethanol 0.41 at admission)- avoid benzodiazepines  -Gabapentin carlito, APAP and oxy PRN for pain.  - Agitation: PRN haldol, carlito clonidine and gabapentin     Pulmonary care:   -Supplemental oxygen to keep saturation above 92 %.     Cardiovascular:    -Monitor hemodynamic status.   -SBP goal <140  - PRN labetalol     GI care:   -NPO except ice chips and medications.  -Bowel regimen: PRN dulcolax, senna  - N/V: PRN reglan     Fluids/ Electrolytes/ Nutrition:   # Hypokalemia  #Hypophosphatemia  - Na 145, K 3.1 Ca 8.7, Mg 2.3, Phos 1.8  -75 ml/hr LR for IV fluid hydration  -Electrolyte replacement protocol  -No indication for parenteral nutrition.     Renal/ Fluid Balance:    - No urine output recorded yet.   -Will continue to monitor intake and output.     Endocrine:    -No management indication.       ID/ Antibiotics:  -No indication for antibiotics.   -WBC 5.5, afebrile     Heme:     -Hemoglobin 15.4  - Plt 202  - Transfuse for Hb <7     Prophylaxis:    -Mechanical prophylaxis for DVT.  -No chemical DVT prophylaxis due to high risk of bleeding     MSK:  - PT/OT     Lines/ tubes/ drains:  -PIVs     Disposition:  -Surgical ICU.     CODE:  - Full    - - - - - - - - - - - - - - - - - - - - - - - - - - - - - - - - - - - - - - - - - - - - - - - - - - - - - - - - - - - - - - - - - - - - -     PRIMARY TEAM: Trauma  PRIMARY PHYSICIAN:   Radhames    REASON FOR CRITICAL CARE ADMISSION: Neurological monitoring  ADMITTING PHYSICIAN: Dr. Solomon Rodriguez    HISTORY PRESENTING ILLNESS:   Antwan Conn is a 50 year old male with no PMH, history of alcoholism, found down at work bathroom and unresponsive, became combative on transport, BIBA with altered mental status and alcoholic intoxication, required ketamine bolus x1 before head CT,  found to have left temporoparietal acute 7mm subdural hemorrhage with no midline deviation, no other known injuries. Patient arrived to SICU awake, easily distracted and agitated but interacting appropriately. Patient does not remember events before his fall. Endorses mild global headache, denies N/V, blurry vision, neck or back pain. Denies aspirin use or anticoagulation.     REVIEW OF SYSTEMS: As noted above. No headache, dizziness. No fever, chills. No chest pain or shortness of breath. No abdominal pain, nausea, vomiting. No diarrhea or constipation. No urinary difficulties. No muscle or body aches.    PAST MEDICAL HISTORY:   No past medical history on file.    SURGICAL HISTORY:   No past surgical history on file.   - Lipoma resection    SOCIAL HISTORY: Tobacco - denies. Etoh - Positive. 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     FAMILY HISTORY: No bleeding/clotting disorders nor problems with anesthesia.     ALLERGIES:    Not on File  No allergies    MEDICATIONS:  No current facility-administered medications on file prior to encounter.   No current outpatient medications on file prior to encounter.      PHYSICAL EXAMINATION:  Temp:  [98.1  F (36.7  C)] 98.1  F (36.7  C)  Pulse:  [] 107  Resp:  [14-22] 17  BP: (126-147)/() 141/97  SpO2:  [96 %-100 %] 96 %  General: Alert, in no acute distress,.  HEENT: Normocephalic, atraumatic. Patent nares.   Neuro: AOx4, PEERLA, EOMI, symmetrical facial expression, tongue midline. Strength 5/5 throughout extremities, sensation intact to light touch.  Neck: No cervical lymphadenopathy.    Chest wall: Symmetric thorax. No masses or tenderness to palpation.   Respiratory: Non-labored breathing. Lung sounds clear to auscultation bilaterally.  Cardiovascular: Regular rate and rhythm.   Gastrointestinal: Abdomen soft, non-distended, non-tender to palpation. No organomegaly or masses appreciated. No inguinal hernias.   Genitourinary: No CVA tenderness.   Extremities: Moving all four extremities. No limb deformities. No pedal edema.  Skin: As noted above. No rashes or lesions appreciated.    LABS: Reviewed.   Arterial Blood Gases   No lab results found in last 7 days.  Complete Blood Count   Recent Labs   Lab 09/17/20  1228   WBC 5.5   HGB 15.4        Basic Metabolic Panel  Recent Labs   Lab 09/17/20  1228   *   POTASSIUM 3.1*   CHLORIDE 112*   CO2 26   BUN 4*   CR 0.70   *     Liver Function Tests  Recent Labs   Lab 09/17/20  1228   AST 72*   ALT 58   ALKPHOS 81   BILITOTAL 0.4   ALBUMIN 3.9     Pancreatic Enzymes  No lab results found in last 7 days.  Coagulation Profile  No lab results found in last 7 days.  Lactate  Invalid input(s): LACTATE    IMAGING:  Results for orders placed or performed during the hospital encounter of 09/17/20   CT Head w/o Contrast    Narrative    CT HEAD W/O CONTRAST 9/17/2020 1:17 PM    History: altered mental status   ICD-10:    Comparison: None    Technique: Using multidetector thin collimation helical acquisition  technique, axial, coronal and sagittal CT images from the skull base  to the vertex were obtained without intravenous contrast.    Findings: There is a hyperdense subdural hemorrhage overlying the left  temporoparietal convexity that measures up to 7 mm. There is no  significant mass effect, or midline shift. Gray/white matter  differentiation in both cerebral hemispheres is preserved. Ventricles  are proportionate to the cerebral sulci. The basal cisterns are clear.    The bony calvaria and the bones of the skull base are normal. There is  a  hematoma overlying the right frontoparietal scalp without evidence  of underlying osseous abnormality. The visualized portions of the  paranasal sinuses and mastoid air cells are clear.       Impression    Impression:  Left-sided temporoparietal acute subdural hemorrhage measuring up to 7  mm extending inferiorly to the tentorium. Right frontoparietal scalp  hematoma is also visualized suggesting a coup-contrecoup mechanism of  injury.     Acute findings were discussed with neuroradiology fellow and  communicated with ED provider at 1:19 PM and 9/17/2020    I have personally reviewed the examination and initial interpretation  and I agree with the findings.    MADAN ORTIZ MD   XR Chest Port 1 View    Narrative    Exam: XR CHEST PORT 1 VW, 9/17/2020 3:00 PM    Indication: altered mental status    Comparison: None    Findings:   Portable radiograph of the chest. Cardiac silhouette is not enlarged.  Streaky left basilar airspace opacities. No pneumothorax. No pleural  effusion. Upper abdomen is unremarkable. No acute osseous  abnormalities.      Impression    Impression: Subtle streaky left basilar airspace opacities, favor  atelectasis over infiltrate.     I have personally reviewed the examination and initial interpretation  and I agree with the findings.    DAVEY VAZQUEZ MD       Patient seen, findings and plan discussed with surgical ICU staff Dr. Rodriguez.

## 2020-09-17 NOTE — ED PROVIDER NOTES
"ED Provider Note  [facility]      History     Chief Complaint   Patient presents with     Altered Mental Status     HPI     Patient is unable to provide history due to altered mental status.  History is obtained from EMS, patient's coworker, and his brother.    Naren De La Cruz is a 89 year old male with a past medical history significant for alcoholism who presents here to the Emergency Department via EMS with an altered mental status.  Patient was found down in a hallway at work.  He became combative on his way here with EMS stating, \"I want to fight\".  Patient arrived without ID.  EMS reports he was picked up at [address]  He work for [employer].        Past Medical History  No past medical history on file.  No past surgical history on file.  No current outpatient medications on file.    Not on File  Family History  No family history on file.  Social History   Social History     Tobacco Use     Smoking status: Not on file   Substance Use Topics     Alcohol use: Not on file     Drug use: Not on file      Past medical history, past surgical history, medications, allergies, family history, and social history were reviewed with the patient. No additional pertinent items.       Review of Systems   Unable to perform ROS: Mental status change     A complete review of systems was attempted but limited due to altered mental status.    Physical Exam   BP: (!) 136/96  Pulse: 98  Temp: 98.1  F (36.7  C)  Resp: 16  Weight: 93.4 kg (206 lb)  SpO2: 96 %  Physical Exam  Vitals signs and nursing note reviewed.   Constitutional:       Appearance: He is not diaphoretic.      Comments: Patient is altered, lying in bed, but intermittently with wake up and resist interventions and calming measures.   HENT:      Head: Normocephalic.      Comments: Hematoma on right temporal-parietal scalp without step-off.     Right Ear: Tympanic membrane normal.      Left Ear: Tympanic membrane " normal.      Nose: Nose normal.      Mouth/Throat:      Mouth: Mucous membranes are moist.      Pharynx: Oropharynx is clear.      Comments: Airway is patent.  Eyes:      General: No scleral icterus.     Extraocular Movements: Extraocular movements intact.      Conjunctiva/sclera: Conjunctivae normal.      Pupils: Pupils are equal, round, and reactive to light.   Neck:      Musculoskeletal: Normal range of motion and neck supple. No muscular tenderness.   Cardiovascular:      Rate and Rhythm: Normal rate.      Pulses: Normal pulses.      Heart sounds: Normal heart sounds.   Pulmonary:      Effort: Pulmonary effort is normal. No respiratory distress.      Breath sounds: Normal breath sounds. No wheezing, rhonchi or rales.   Chest:      Chest wall: No tenderness.   Abdominal:      General: Abdomen is flat. There is no distension.      Palpations: Abdomen is soft. There is no mass.      Tenderness: There is no abdominal tenderness. There is no guarding or rebound.      Hernia: No hernia is present.   Musculoskeletal: Normal range of motion.         General: No tenderness.      Right lower leg: No edema.      Left lower leg: No edema.   Skin:     General: Skin is warm.      Capillary Refill: Capillary refill takes less than 2 seconds.      Findings: No rash.   Neurological:      GCS: GCS eye subscore is 3. GCS verbal subscore is 4. GCS motor subscore is 5.      Cranial Nerves: Cranial nerves are intact.      Sensory: Sensation is intact.      Motor: Motor function is intact.      Coordination: Coordination is intact.      Deep Tendon Reflexes:      Reflex Scores:       Patellar reflexes are 2+ on the right side and 2+ on the left side.       Achilles reflexes are 2+ on the right side and 2+ on the left side.     Comments: Neurologic exam is limited due to patient's having altered mental status.   Psychiatric:      Comments: Unable to perform this portion of the examination due to patient's having altered mental status.          ED Course      Procedures               EKG Interpretation:      Interpreted by Kendrick Ricci MD  Time reviewed: 12:42 PM  Symptoms at time of EKG: Altered mental status   Rhythm: normal sinus   Rate: normal  Axis: LAD  Ectopy: none  Conduction: normal  ST Segments/ T Waves: No ST-T wave changes  Q Waves: none  Comparison to prior: No old EKG available    Clinical Impression: NSR, LAD, no ischemic changes       Critical Care Addendum    My initial assessment, based on my review of prehospital provider report, review of nursing observations, review of vital signs, focused history, physical exam, review of cardiac rhythm monitor, 12 lead ECG analysis and discussion with Patient's coworker and his brother, established that Naren De La Cruz has severe traumatic injuries, which requires immediate intervention, and therefore he is critically ill.     After the initial assessment, the care team initiated multiple lab tests and consulted with Trauma and neurosurgery services to provide stabilization care. Due to the critical nature of this patient, I reassessed nursing observations, vital signs, physical exam, review of cardiac rhythm monitor, interpretation of Laboratory studies and CT imaging, mental status, neurologic status and respiratory status multiple times prior to his disposition.     Time also spent performing documentation, discussion with family to obtain medical information for decision making, reviewing test results, discussion with consultants and coordination of care.     Critical care time (excluding teaching time and procedures): 45 minutes.   Trauma:  Level of trauma activation: Trauma evaluation (consult) called at 1:56 PM  C-collar and immobilization: not indicated, cleared.  CSpine Clearance: C spine cleared clinically  GCS at arrival: 13  GCS at disposition: unchanged  Full Primary and Secondary survey with appropriate immobilization of spine completed in exam section.  Consults  prior to admission or transfer: Neurosurgery called at 1:36 PM  Procedures done in the ED: none        Results for orders placed or performed during the hospital encounter of 09/17/20   CT Head w/o Contrast     Status: None    Narrative    CT HEAD W/O CONTRAST 9/17/2020 1:17 PM    History: altered mental status   ICD-10:    Comparison: None    Technique: Using multidetector thin collimation helical acquisition  technique, axial, coronal and sagittal CT images from the skull base  to the vertex were obtained without intravenous contrast.    Findings: There is a hyperdense subdural hemorrhage overlying the left  temporoparietal convexity that measures up to 7 mm. There is no  significant mass effect, or midline shift. Gray/white matter  differentiation in both cerebral hemispheres is preserved. Ventricles  are proportionate to the cerebral sulci. The basal cisterns are clear.    The bony calvaria and the bones of the skull base are normal. There is  a hematoma overlying the right frontoparietal scalp without evidence  of underlying osseous abnormality. The visualized portions of the  paranasal sinuses and mastoid air cells are clear.       Impression    Impression:  Left-sided temporoparietal acute subdural hemorrhage measuring up to 7  mm extending inferiorly to the tentorium. Right frontoparietal scalp  hematoma is also visualized suggesting a coup-contrecoup mechanism of  injury.     Acute findings were discussed with neuroradiology fellow and  communicated with ED provider at 1:19 PM and 9/17/2020    I have personally reviewed the examination and initial interpretation  and I agree with the findings.    MADAN ORTIZ MD   XR Chest Port 1 View     Status: None    Narrative    Exam: XR CHEST PORT 1 VW, 9/17/2020 3:00 PM    Indication: altered mental status    Comparison: None    Findings:   Portable radiograph of the chest. Cardiac silhouette is not enlarged.  Streaky left basilar airspace opacities. No  pneumothorax. No pleural  effusion. Upper abdomen is unremarkable. No acute osseous  abnormalities.      Impression    Impression: Subtle streaky left basilar airspace opacities, favor  atelectasis over infiltrate.     I have personally reviewed the examination and initial interpretation  and I agree with the findings.    DAVEY VAZQUEZ MD   CBC with platelets differential     Status: None   Result Value Ref Range    WBC 5.5 4.0 - 11.0 10e9/L    RBC Count 5.06 4.4 - 5.9 10e12/L    Hemoglobin 15.4 13.3 - 17.7 g/dL    Hematocrit 45.6 40.0 - 53.0 %    MCV 90 78 - 100 fl    MCH 30.4 26.5 - 33.0 pg    MCHC 33.8 31.5 - 36.5 g/dL    RDW 13.1 10.0 - 15.0 %    Platelet Count 202 150 - 450 10e9/L    Diff Method Automated Method     % Neutrophils 29.2 %    % Lymphocytes 56.5 %    % Monocytes 9.3 %    % Eosinophils 3.5 %    % Basophils 1.1 %    % Immature Granulocytes 0.4 %    Nucleated RBCs 0 0 /100    Absolute Neutrophil 1.6 1.6 - 8.3 10e9/L    Absolute Lymphocytes 3.1 0.8 - 5.3 10e9/L    Absolute Monocytes 0.5 0.0 - 1.3 10e9/L    Absolute Eosinophils 0.2 0.0 - 0.7 10e9/L    Absolute Basophils 0.1 0.0 - 0.2 10e9/L    Abs Immature Granulocytes 0.0 0 - 0.4 10e9/L    Absolute Nucleated RBC 0.0    Comprehensive metabolic panel     Status: Abnormal   Result Value Ref Range    Sodium 145 (H) 133 - 144 mmol/L    Potassium 3.1 (L) 3.4 - 5.3 mmol/L    Chloride 112 (H) 94 - 109 mmol/L    Carbon Dioxide 26 20 - 32 mmol/L    Anion Gap 6 3 - 14 mmol/L    Glucose 120 (H) 70 - 99 mg/dL    Urea Nitrogen 4 (L) 7 - 30 mg/dL    Creatinine 0.70 0.66 - 1.25 mg/dL    GFR Estimate 67 >60 mL/min/[1.73_m2]    GFR Estimate If Black 77 >60 mL/min/[1.73_m2]    Calcium 8.7 8.5 - 10.1 mg/dL    Bilirubin Total 0.4 0.2 - 1.3 mg/dL    Albumin 3.9 3.4 - 5.0 g/dL    Protein Total 7.8 6.8 - 8.8 g/dL    Alkaline Phosphatase 81 40 - 150 U/L    ALT 58 0 - 70 U/L    AST 72 (H) 0 - 45 U/L   TSH     Status: None   Result Value Ref Range    TSH 2.39 0.40 - 4.00 mU/L    Alcohol level blood     Status: Abnormal   Result Value Ref Range    Ethanol g/dL 0.41 (HH) <0.01 g/dL   Acetaminophen level     Status: None   Result Value Ref Range    Acetaminophen Level <2 mg/L   Salicylate level     Status: None   Result Value Ref Range    Salicylate Level <2 mg/dL   Ammonia     Status: None   Result Value Ref Range    Ammonia 32 10 - 50 umol/L   Glucose by meter     Status: Abnormal   Result Value Ref Range    Glucose 110 (H) 70 - 99 mg/dL   Magnesium     Status: None   Result Value Ref Range    Magnesium 2.3 1.6 - 2.3 mg/dL   Phosphorus     Status: Abnormal   Result Value Ref Range    Phosphorus 1.8 (L) 2.5 - 4.5 mg/dL   INR     Status: None   Result Value Ref Range    INR 1.10 0.86 - 1.14   EKG 12-lead, tracing only     Status: None   Result Value Ref Range    Interpretation ECG Click View Image link to view waveform and result      Medications   levETIRAcetam (KEPPRA) intermittent infusion 500 mg (has no administration in time range)   labetalol (NORMODYNE/TRANDATE) syringe 10-20 mg (has no administration in time range)   potassium chloride 10 mEq in 100 mL intermittent infusion with 10 mg lidocaine (has no administration in time range)   potassium phosphate 15 mmol in D5W 250 mL intermittent infusion (has no administration in time range)   lactated ringers infusion (has no administration in time range)   ketamine (KETALAR) injection 100 mg (100 mg Intravenous Given 9/17/20 1300)        Assessments & Plan (with Medical Decision Making)   Patient brought in by ambulance after being found down at work altered.  No history available.  No identification available on the patient.  I will obtain CT of the head and laboratory studies along with an EKG for further diagnostic evaluation.  Due to patient's intermittent combativeness he will be administered a dose of intravenous ketamine in order to calm him down and control his behavior so that we can obtain CT of the head.    Patient's laboratory  studies returned without any evidence of leukocytosis, WBC is normal at 5500. There is no evidence of anemia, hemoglobin is normal at 15.4.  Electrolytes show no evidence of dehydration, creatinine is normal at 0.7. There is hypokalemia with potassium 3.1.  TSH is normal at 2.39.  Ethanol is significantly elevated 0.41.  Salicylate is undetectable and ammonia is normal at 32.  Glucose is normal at 110.  I reviewed his CT of the head and I read the radiology report; there is left-sided temporal parietal acute subdural hemorrhage. There is also right frontal parietal scalp hematoma.  Neurosurgery and trauma was consulted.    I was able to speak to the patient's coworker who provided me with his emergency contact information.  His emergency contact is his brother, Kenyon Conn, ([phone]).  Informed the patient's name is Ryder Conn with birthdate of [birth year].  He knows the patient has history of alcoholism and depression, however, no other medical problems.  Kenyon would like to be patients emergency contact throughout his hospital stay and he would like to be contacted with any questions or concerns regarding Ryder's care.    This part of the medical record was transcribed by Karen Glaser, Medical Scribe, from a dictation done by Kendrick Ricci MD.      I have reviewed the nursing notes. I have reviewed the findings, diagnosis, plan and need for follow up with the patient.    New Prescriptions    No medications on file       Final diagnoses:   Subdural hemorrhage (H)   Closed head injury, initial encounter   Alcoholic intoxication with complication (H)   Karen MI, am serving as a trained medical scribe to document services personally performed by Kendrick Ricci MD, MD, based on the provider's statements to me.     Radhames MI Nikola, MD, MD, was physically present and have reviewed and verified the accuracy of this note documented by Karen Glaser.     --  Kendrick Ricci,  MD  [facility], [place], EMERGENCY DEPARTMENT  9/17/2020     Kendrick Ricci MD  09/17/20 [id number]

## 2020-09-17 NOTE — PLAN OF CARE
"Pt arrived from ED at around 1700.    D/I/A: Pt on 4A Neuro/Surgical ICU s/p subdural hematoma  Neuro: intact. Pupils 3 E/R, sluggish, unchanged since ED. Intermittently agitated. Lethargic/drowsy. Disoriented to situation only. Denies N/T. On CIWAA scale. Neuros Q1H. Unsteady on feet, high falls risk.   CV: Tachy 100-110s. Afebrile. Sinus tachycardia. SBP goal <140, WNL. C-spine cleared per CT scan.  Resp: 90% on RA while asleep, upper 90s on 1L NC while asleep. LS clear. Denies SOB.  GI: NPO except ice chips. C/o nausea, resolved on its own.  : Has urge to void but refuses help. Pt \"will hold it then.\" Has not voided since admission.  Line access: R PIV. Waiting for additional PIV to be placed.   Skin: Intact, one scrape on R hand, friction rash on back.  Gtts: LR 75. Keppra given. Thiamine ordered.  Lytes: Potassium Phos infusing.   Plan: Continue to monitor and notify MD with changes.     "

## 2020-09-17 NOTE — CONSULTS
[facility]       NEUROSURGERY CONSULTATION H&P NOTE    This consultation was requested by Dr. Ricci from the ED service.    Reason for Consultation: AMS 7mm L parietal convexity SDH    HPI:    History obtained from brother:    50M, PMH alcohol abuse, not on anticoagulation, brought in by ambulance after being found down in bathroom while at work. Presenting with AMS, agitation, found to have BAL of 0.41 with CT-H showing 7mm thick L parietal acute SDH.     Patient admits to drinking this morning but does not believe he drank that much (cannot name number of drinks). States he does not recall many events subsequent to waking up in the morning.    He denies any headache, weakness, numbness, tingling, nausea. Denies changes in vision. Denies any fevers, rigors, or chills.     PAST MEDICAL HISTORY: No past medical history on file.    PAST SURGICAL HISTORY: Just retired from being   FAMILY HISTORY: No family history on file.    SOCIAL HISTORY:   Social History     Tobacco Use     Smoking status: Not on file   Substance Use Topics     Alcohol use: Not on file       MEDICATIONS:  No current outpatient medications on file.       Allergies:  Not on File    ROS: 10 point ROS of systems including Constitutional, Eyes, Respiratory, Cardiovascular, Gastroenterology, Genitourinary, Integumentary, Muscularskeletal, Psychiatric were all negative except for pertinent positives noted in my HPI.    Physical exam:   Blood pressure (!) 138/100, pulse 107, temperature 98.1  F (36.7  C), temperature source Oral, resp. rate 22, SpO2 97 %.  General: awake and alert  HEENT: atraumatic, normocephalic  PULM: breathing comfortably on room air  NEUROLOGIC:  -- Awake; Alert; oriented x 3  -- Follows commands briskly  -- Speech fluent, spontaneous. No aphasia or dysarthria.  -- no gaze preference. No apparent hemineglect.  Cranial Nerves:  -- visual fields full to confrontation, PERRL 3mm  bilat and brisk, extraocular movements intact  -- face symmetrical, tongue midline  -- sensory V1-V3 intact bilaterally  -- palate elevates symmetrically, uvula midline  -- hearing grossly intact bilat  -- Trapezii 5/5 strength bilat symmetric  -- Cerebellar: Finger nose finger without dysmetria, intact rapid alternating motions bilaterally    Motor:  Normal bulk / tone; no tremor, rigidity, or bradykinesia.  No muscle wasting or fasciculations  No Pronator Drift     Delt Bi Tri Hand Flexion/  Extension Iliopsoas  (hip flexion) Quadriceps (knee extension) Hamstrings (knees) Tibialis Anterior (dorsiflexion;inversion) Gastroc (plantarflexion)    C5 C6 C7 C8/T1 L2 L3 L4-S1 L4 S1   R 5 5 5 5 5 5 5 5 5   L 5 5 5 5 5 5 5 5 5   Sensory:  intact to LT x 4 extremities       Reflexes:       Bi BR Pat Ach Bab     C5-6 C6 L2-4 S1 UMN   R 2+ 2+ 2+ 2+ Norm   L 2+ 2+ 2+ 2+ Norm         IMAGIN. Stable left temporal parietal subdural hematoma, again measures  approximately 7 mm in maximum thickness.  2. Small amount of subarachnoid hemorrhage filling the left temporal  sulci, grossly stable but appears more conspicuous in the current  study compared with same day previous CT, likely from technical  reasons.  3. Stable nondisplaced right zygomatic arch fracture.  4. Stable right scalp hematoma.    LABS:   Last Comprehensive Metabolic Panel:  Sodium   Date Value Ref Range Status   2020 145 (H) 133 - 144 mmol/L Final     Potassium   Date Value Ref Range Status   2020 3.1 (L) 3.4 - 5.3 mmol/L Final     Chloride   Date Value Ref Range Status   2020 112 (H) 94 - 109 mmol/L Final     Carbon Dioxide   Date Value Ref Range Status   2020 26 20 - 32 mmol/L Final     Anion Gap   Date Value Ref Range Status   2020 6 3 - 14 mmol/L Final     Glucose   Date Value Ref Range Status   2020 120 (H) 70 - 99 mg/dL Final     Urea Nitrogen   Date Value Ref Range Status   2020 4 (L) 7 - 30 mg/dL Final      Creatinine   Date Value Ref Range Status   09/17/2020 0.70 0.66 - 1.25 mg/dL Final     GFR Estimate   Date Value Ref Range Status   09/17/2020 67 >60 mL/min/[1.73_m2] Final     Comment:     Non  GFR Calc  Starting 12/18/2018, serum creatinine based estimated GFR (eGFR) will be   calculated using the Chronic Kidney Disease Epidemiology Collaboration   (CKD-EPI) equation.       Calcium   Date Value Ref Range Status   09/17/2020 8.7 8.5 - 10.1 mg/dL Final     Lab Results   Component Value Date    WBC 5.5 09/17/2020     Lab Results   Component Value Date    RBC 5.06 09/17/2020     Lab Results   Component Value Date    HGB 15.4 09/17/2020     Lab Results   Component Value Date    HCT 45.6 09/17/2020     Lab Results   Component Value Date    MCV 90 09/17/2020     Lab Results   Component Value Date    MCH 30.4 09/17/2020     Lab Results   Component Value Date    MCHC 33.8 09/17/2020     Lab Results   Component Value Date    RDW 13.1 09/17/2020     Lab Results   Component Value Date     09/17/2020     No results found for: INR   No results found for: PTT   @Fibrinogen1@    ASSESSMENT: [age]M, PMH alcohol abuse, reportedly not on anticoagulation, pmh alcohol abuse (abbreviated history as reported per brother) brought in by ambulance after being found down in bathroom while at work. Neurological exam limited by ketamine and BAL of 0.41, CT-H showing 7mm thick SDH. Stable on repeat imaging.    RECOMMENDATIONS:  1. Repeat head CT in 4 hours from the time of first acquisition (5PM) (done)  2. F/u in clinic in 2 weeks with MK with repeat head CT (schedulers have been emailed)    Neurosurgery signing off      Ruperto Palumbo MD  Neurosurgery Resident, PGY-1    The patient was discussed with Dr. Gennaro Figueroa, neurosurgery chief resident, and Dr. Doreen Riddle, neurosurgery staff.    I have reviewed the history above and agree with the resident's assessment and plan.  SERGIO Riddle MD

## 2020-09-17 NOTE — ED TRIAGE NOTES
"Found down at his place of employment, unresponsive initially for EMS. . In route patient became more alert, stating \"I wanna fight\". On arrival patient has eyes open at times, but does not speak or respond to commands/pain. 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   "

## 2020-09-17 NOTE — PROGRESS NOTES
Admitted/transferred from: ED  Reason for admission/transfer: Subdural hematoma  2 RN skin assessment: completed by Suzie Hinojosa RNs  Result of skin assessment and interventions/actions: intact  Height, weight, drug calc weight: Not Done  Patient belongings (see Flowsheet): only clothes remain with pt. Pt called coworker for remainder of items to be brought to hospital.  MDRO education added to care planN/A  ?

## 2020-09-17 NOTE — LETTER
[facility]  [address] [facility]  [address] [address]  [phone]  Dept: [phone]      9/18/2020    Re: Ryder Conn      TO WHOM IT MAY CONCERN:    Ryder Conn  was seen and received care at the [facility]. He is recommended to take the next week off work to allow for adequate recovery. Please excuse him  until 09/28/2020 due to injury.    If additional time is needed off, he should be seen by a Primary Care provided and make that request as needed.    Jose Luis Aceves CNP  Department of Acute and General Care Services  Trauma  PWB, [address]th [place] [address]  Phone: [phone]

## 2020-09-18 ENCOUNTER — APPOINTMENT (OUTPATIENT)
Dept: PHYSICAL THERAPY | Facility: CLINIC | Age: 50
DRG: 083 | End: 2020-09-18
Attending: NURSE PRACTITIONER
Payer: COMMERCIAL

## 2020-09-18 VITALS
RESPIRATION RATE: 20 BRPM | TEMPERATURE: 98.7 F | WEIGHT: 239.64 LBS | SYSTOLIC BLOOD PRESSURE: 139 MMHG | DIASTOLIC BLOOD PRESSURE: 103 MMHG | OXYGEN SATURATION: 96 % | HEART RATE: 96 BPM

## 2020-09-18 LAB
ANION GAP SERPL CALCULATED.3IONS-SCNC: 9 MMOL/L (ref 3–14)
BUN SERPL-MCNC: 6 MG/DL (ref 7–30)
CALCIUM SERPL-MCNC: 8.6 MG/DL (ref 8.5–10.1)
CHLORIDE SERPL-SCNC: 105 MMOL/L (ref 94–109)
CO2 SERPL-SCNC: 24 MMOL/L (ref 20–32)
CREAT SERPL-MCNC: 0.5 MG/DL (ref 0.66–1.25)
ERYTHROCYTE [DISTWIDTH] IN BLOOD BY AUTOMATED COUNT: 12.9 % (ref 10–15)
GFR SERPL CREATININE-BSD FRML MDRD: >90 ML/MIN/{1.73_M2}
GLUCOSE BLDC GLUCOMTR-MCNC: 107 MG/DL (ref 70–99)
GLUCOSE SERPL-MCNC: 104 MG/DL (ref 70–99)
HCT VFR BLD AUTO: 43.3 % (ref 40–53)
HGB BLD-MCNC: 14.4 G/DL (ref 13.3–17.7)
INR PPP: 1.14 (ref 0.86–1.14)
LABORATORY COMMENT REPORT: NORMAL
MAGNESIUM SERPL-MCNC: 1.5 MG/DL (ref 1.6–2.3)
MAGNESIUM SERPL-MCNC: 2.8 MG/DL (ref 1.6–2.3)
MCH RBC QN AUTO: 29.9 PG (ref 26.5–33)
MCHC RBC AUTO-ENTMCNC: 33.3 G/DL (ref 31.5–36.5)
MCV RBC AUTO: 90 FL (ref 78–100)
PHOSPHATE SERPL-MCNC: 3.5 MG/DL (ref 2.5–4.5)
PLATELET # BLD AUTO: 197 10E9/L (ref 150–450)
POTASSIUM SERPL-SCNC: 3.1 MMOL/L (ref 3.4–5.3)
POTASSIUM SERPL-SCNC: 3.8 MMOL/L (ref 3.4–5.3)
RBC # BLD AUTO: 4.82 10E12/L (ref 4.4–5.9)
SARS-COV-2 RNA SPEC QL NAA+PROBE: NEGATIVE
SARS-COV-2 RNA SPEC QL NAA+PROBE: NORMAL
SODIUM SERPL-SCNC: 138 MMOL/L (ref 133–144)
SPECIMEN SOURCE: NORMAL
SPECIMEN SOURCE: NORMAL
WBC # BLD AUTO: 11.2 10E9/L (ref 4–11)

## 2020-09-18 PROCEDURE — 25000125 ZZHC RX 250: Performed by: NURSE PRACTITIONER

## 2020-09-18 PROCEDURE — 85027 COMPLETE CBC AUTOMATED: CPT | Performed by: NURSE PRACTITIONER

## 2020-09-18 PROCEDURE — 25000132 ZZH RX MED GY IP 250 OP 250 PS 637: Performed by: NURSE PRACTITIONER

## 2020-09-18 PROCEDURE — 25000128 H RX IP 250 OP 636: Performed by: NURSE PRACTITIONER

## 2020-09-18 PROCEDURE — 83735 ASSAY OF MAGNESIUM: CPT | Performed by: NURSE PRACTITIONER

## 2020-09-18 PROCEDURE — 36415 COLL VENOUS BLD VENIPUNCTURE: CPT | Performed by: NURSE PRACTITIONER

## 2020-09-18 PROCEDURE — 84132 ASSAY OF SERUM POTASSIUM: CPT | Performed by: NURSE PRACTITIONER

## 2020-09-18 PROCEDURE — 97530 THERAPEUTIC ACTIVITIES: CPT | Mod: GP

## 2020-09-18 PROCEDURE — 25800030 ZZH RX IP 258 OP 636: Performed by: NURSE PRACTITIONER

## 2020-09-18 PROCEDURE — 85610 PROTHROMBIN TIME: CPT | Performed by: NURSE PRACTITIONER

## 2020-09-18 PROCEDURE — 97116 GAIT TRAINING THERAPY: CPT | Mod: GP

## 2020-09-18 PROCEDURE — 84100 ASSAY OF PHOSPHORUS: CPT | Performed by: NURSE PRACTITIONER

## 2020-09-18 PROCEDURE — 99233 SBSQ HOSP IP/OBS HIGH 50: CPT | Mod: GC | Performed by: ANESTHESIOLOGY

## 2020-09-18 PROCEDURE — 80048 BASIC METABOLIC PNL TOTAL CA: CPT | Performed by: NURSE PRACTITIONER

## 2020-09-18 PROCEDURE — 00000146 ZZHCL STATISTIC GLUCOSE BY METER IP

## 2020-09-18 PROCEDURE — 97112 NEUROMUSCULAR REEDUCATION: CPT | Mod: GP

## 2020-09-18 PROCEDURE — 97161 PT EVAL LOW COMPLEX 20 MIN: CPT | Mod: GP

## 2020-09-18 RX ORDER — FOLIC ACID 1 MG/1
1 TABLET ORAL DAILY
Qty: 30 TABLET | Refills: 0 | Status: SHIPPED | OUTPATIENT
Start: 2020-09-20 | End: 2020-09-18

## 2020-09-18 RX ORDER — LANOLIN ALCOHOL/MO/W.PET/CERES
100 CREAM (GRAM) TOPICAL DAILY
Qty: 30 TABLET | Refills: 0 | Status: SHIPPED | OUTPATIENT
Start: 2020-09-18

## 2020-09-18 RX ORDER — LEVETIRACETAM 500 MG/1
500 TABLET ORAL EVERY 12 HOURS
Status: DISCONTINUED | OUTPATIENT
Start: 2020-09-18 | End: 2020-09-18 | Stop reason: HOSPADM

## 2020-09-18 RX ORDER — LEVETIRACETAM 500 MG/1
500 TABLET ORAL EVERY 12 HOURS
Qty: 14 TABLET | Refills: 0 | Status: SHIPPED | OUTPATIENT
Start: 2020-09-18

## 2020-09-18 RX ORDER — LANOLIN ALCOHOL/MO/W.PET/CERES
100 CREAM (GRAM) TOPICAL DAILY
Qty: 30 TABLET | Refills: 0 | Status: SHIPPED | OUTPATIENT
Start: 2020-09-18 | End: 2020-09-18

## 2020-09-18 RX ORDER — LEVETIRACETAM 500 MG/1
500 TABLET ORAL EVERY 12 HOURS
Qty: 14 TABLET | Refills: 0 | Status: SHIPPED | OUTPATIENT
Start: 2020-09-18 | End: 2020-09-18

## 2020-09-18 RX ORDER — ACETAMINOPHEN 325 MG/1
650 TABLET ORAL EVERY 4 HOURS PRN
COMMUNITY
Start: 2020-09-18

## 2020-09-18 RX ORDER — FOLIC ACID 1 MG/1
1 TABLET ORAL DAILY
Qty: 30 TABLET | Refills: 0 | Status: SHIPPED | OUTPATIENT
Start: 2020-09-20

## 2020-09-18 RX ADMIN — FOLIC ACID 1 MG: 5 INJECTION, SOLUTION INTRAMUSCULAR; INTRAVENOUS; SUBCUTANEOUS at 08:23

## 2020-09-18 RX ADMIN — LEVETIRACETAM 500 MG: 5 INJECTION INTRAVENOUS at 04:01

## 2020-09-18 RX ADMIN — THIAMINE HYDROCHLORIDE 200 MG: 100 INJECTION, SOLUTION INTRAMUSCULAR; INTRAVENOUS at 08:22

## 2020-09-18 RX ADMIN — MULTIPLE VITAMINS W/ MINERALS TAB 1 TABLET: TAB at 08:22

## 2020-09-18 RX ADMIN — LABETALOL 20 MG/4 ML (5 MG/ML) INTRAVENOUS SYRINGE 10 MG: at 10:43

## 2020-09-18 RX ADMIN — POTASSIUM CHLORIDE 20 MEQ: 750 TABLET, EXTENDED RELEASE ORAL at 08:22

## 2020-09-18 RX ADMIN — CLONIDINE HYDROCHLORIDE 0.1 MG: 0.1 TABLET ORAL at 10:43

## 2020-09-18 RX ADMIN — CLONIDINE HYDROCHLORIDE 0.1 MG: 0.1 TABLET ORAL at 01:42

## 2020-09-18 RX ADMIN — MAGNESIUM SULFATE HEPTAHYDRATE 4 G: 40 INJECTION, SOLUTION INTRAVENOUS at 05:34

## 2020-09-18 RX ADMIN — POTASSIUM CHLORIDE 40 MEQ: 750 TABLET, EXTENDED RELEASE ORAL at 05:34

## 2020-09-18 RX ADMIN — GABAPENTIN 900 MG: 300 CAPSULE ORAL at 08:22

## 2020-09-18 ASSESSMENT — ACTIVITIES OF DAILY LIVING (ADL)
ADLS_ACUITY_SCORE: 20
ADLS_ACUITY_SCORE: 20
RETIRED_EATING: 0-->INDEPENDENT
AMBULATION: 0-->INDEPENDENT
COGNITION: 0 - NO COGNITION ISSUES REPORTED
SWALLOWING: 0-->SWALLOWS FOODS/LIQUIDS WITHOUT DIFFICULTY
TRANSFERRING: 0-->INDEPENDENT
BATHING: 0-->INDEPENDENT
ADLS_ACUITY_SCORE: 20
ADLS_ACUITY_SCORE: 12
FALL_HISTORY_WITHIN_LAST_SIX_MONTHS: NO
DRESS: 0-->INDEPENDENT
RETIRED_COMMUNICATION: 0-->UNDERSTANDS/COMMUNICATES WITHOUT DIFFICULTY
TOILETING: 0-->INDEPENDENT

## 2020-09-18 NOTE — PLAN OF CARE
"4A Discharge Planner PT   Patient plan for discharge: home  Current status: PT evaluation completed. Treatment indicated. B foot neuropathy. Brother \"Kenyon\" present. Both brother and pt declined cognition deficits - no apparent deficits noted during session. SBA for transfers and bed mobility for line management - reports baseline lightheadedness upon standing. Ambulated > 1,000 ft with engagement in balance challenges - very intermittent bouts of unsteadiness that pt safely corrected/adjusted. Negotiated 3 x 4 stairs with use of 1 rail - unsteady initially 2/2 impulsivity, improved after education/cues for safety. Educated fall prevention strategies. No concerns with safe discharge home. Recs up SBA.     Barriers to return to prior living situation: medical status  Recommendations for discharge: home  Rationale for recommendations: Did educate pt that if he starts to experience cognition deficits with higher level tasks (I.e. return to work) to notify his provider as pt would then benefit from OP OT. Recommend use of shoes for all mobility in home and outside 2/2 B foot neuropathy.          Entered by: Shelly Wills 09/18/2020 10:50 AM       "

## 2020-09-18 NOTE — PROGRESS NOTES
[facility]   Tertiary Survey Progress Note     Date of Service (when I saw the patient): 09/18/2020     Assessment & Plan      Trauma Mechanism:found down at work  Date and time of injury: 09/17/2020 am  Known Injuries:  Left-sided temporoparietal acute subdural hemorrhage measuring up to 7  mm                   Other diagnoses:  1. Acute alcohol intoxication  2. Closed head injury  3. Insomnia    Tertiary exam today negative for other injuries     Neuro/Pain:  # Left side SDH  # Acute alcohol withdrawal  # Encephalopathy possibly secondary to 1 and 2 above  Seen and followed by Neurosurgery  Repeat CT head obtained with stable SDH  No neurological changes reported overnight  A&O x4, no focal deficits, complains of headache and fogginess  OT to complete TBI screen today  Continue to monitor Neurostatus  ETOH abuse care:  CIWA-Ar protocol ordered, with Gabapentin taper, thiamine, Folic and MVI.  - Denies any prior history of withdrawal seizures, no obvious withdraw symptoms noted today or benzo requirement overnight.  - States he was participating in AAA meetings for prior ETOH abuse, however this has been on hold due to the COVID 19 pandemic, with subsequent relapse.     Pulmonary:  Supplemental oxygen to keep saturation above 92 %.  Incentive spirometer while awake      Cardiovascular:    No known PMH  PRN labetalol ordered for SBP >140  - Monitor hemodynamic status.      GI/Nutrition:    Regular diet  PRN antiemetics available  SL MIVF      Fluids/Electrolytes/ Renal  # Hypokalemia,  # Hypophosphatemia  Probably due to chronic ETOH intake and poor nutritional intake  Replete per protocol  MIVF ordered while NPO for IV fluid hydration.   - electrolyte replacement protocol  In place.   - Monitor I/O  Endocrine:  - No management indication.   Infectious disease:   No indications for antibiotics.      Hematology:    - Hemoglobin is stable 15.4->14.4  Leukocytosis: 2/2  trauma, afebrile, monitor  Threshold for transfusion if hgb <7.0 or signs/symptoms of hypoperfusion.        Musculoskeletal:  - No mobility restrictions  - Physical and occupational therapy consults when appropraite     Lines/ tubes/ drains:  PIV     General Cares:               PPI/H2 blocker: n /a              DVT prophylaxis: mechanical              Bowel Regimen/Date of last stool: unknown, PTA              ETOH screen completed BAL 0.41 on admission              Frailty Score: ERIC              Lines / drains: PIV    Disposition: OK to transfer out of ICU today vs possibility of discharge to home pending diet tolerance, therapy eval, safe discharge plan and follow up  Code status:  Full code     Interval History   Reports headache. Denies shortness of breath or abdominal pain     ETOH: unable to complete 2/2 AMS.  BAL 0.41    Discharge goals:     Adequate pain management: yes    VSS x24 hours: yes    Hemoglobin stable x 48 hours: yes    Ambulating safely and/or therapy evals complete: pending    Drains/lines removed or plan in place to manage: yes    Teaching done: ongoing    Other:  Expected D/C date: today vs tomorrow?    Interval History   50yr old with a PMH of alcohol abuse who was found down at work altered. A CT scan of his head was obtained which showed a 7mm left tempo parietal SDH and a BAL of 0.4.      Review Of Systems  Skin: negative  Eyes: negative  Ears/Nose/Throat: negative  Respiratory: No shortness of breath, dyspnea on exertion, cough, or hemoptysis  Cardiovascular: negative  Gastrointestinal: negative  Genitourinary: negative  Musculoskeletal: negative  Neurologic: negative  Psychiatric: hx ETOH abuse  Hematologic/Lymphatic/Immunologic: negative  Endocrine: negative     Physical Exam     Fransisca Coma Scale - Total 15/15  Eye Response (E): 4   4= spontaneous, 3= to verbal/voice, 2= to pain, 1= No response   Verbal Response (V): 5   5= Orientated, converses, 4= Confused, converses, 3=  Inappropriate words, 2= Incomprehensible sounds, 1=No response   Motor Response (M): 6   6= Obeys commands, 5= Localizes to pain, 4= Withdrawal to pain, 3=Fexion to pain, 2= Extension to pain, 1= No response       FRAIL Score not completed due to: Age     Physical Exam  HENT:      Head: Normocephalic.      Nose: Nose normal.      Mouth/Throat:      Mouth: Mucous membranes are moist.   Eyes:      Pupils: Pupils are equal, round, and reactive to light.   Neck:      Musculoskeletal: Normal range of motion.   Cardiovascular:      Rate and Rhythm: Normal rate and regular rhythm.      Heart sounds: No murmur.   Pulmonary:      Effort: Pulmonary effort is normal.   Abdominal:      General: Abdomen is flat.      Palpations: Abdomen is soft.      Tenderness: There is no abdominal tenderness.   Musculoskeletal: Normal range of motion.   Skin:     General: Skin is warm and dry.      Capillary Refill: Capillary refill takes less than 2 seconds.   Neurological:      General: No focal deficit present.      Mental Status: He is alert and oriented to person, place, and time.   Psychiatric:         Mood and Affect: Mood normal.         Behavior: Behavior normal.         Temp: 98  F (36.7  C) Temp src: Oral BP: (!) 145/94 Pulse: 100   Resp: 20 SpO2: 95 % O2 Device: None (Room air) Oxygen Delivery: 1 LPM  Vitals:    09/17/20 1500 09/18/20 0400   Weight: 93.4 kg (206 lb) 108.7 kg (239 lb 10.2 oz)     Vital Signs with Ranges  Temp:  [97.8  F (36.6  C)-98.5  F (36.9  C)] 98  F (36.7  C)  Pulse:  [] 100  Resp:  [14-22] 20  BP: (118-147)/() 145/94  SpO2:  [90 %-100 %] 95 %  I/O last 3 completed shifts:  In: 867.5 [I.V.:867.5]  Out: 600 [Urine:600]      PATTIE Persaud CNP  To contact the trauma service use job code pager [phone],   Numeric texts or alpha text through [facility]

## 2020-09-18 NOTE — DISCHARGE SUMMARY
[facility], [place]    Discharge Summary  Trauma Surgery Service    Date of Admission:  9/17/2020  Date of Discharge:  9/18/2020  Attending Physician: Dr. Lukas Corrigan  Discharging Provider: Candie Aceves CNP  Date of Service (when I saw the patient): 09/18/20    Primary Provider: No primary care provider on file.  Primary Care clinic: No primary provider on file.  Phone: None  Fax number: None     Discharge Diagnoses   Closed head injury,   Alcoholic intoxication   SDH (subdural hematoma) (H)    Hospital Course   Mr. Ryder Conn is a 50yr old male with a PMH of alcohol misuse who was brought to the ED via EMS after being found down at work altered. On admission he was combative requiring a dose of Ketamine in order to obtain a CT scan of his head. The CT indicated a left 7mm tempo parietal SDH. Of note his BAL was 0.41. He was admitted to the SICU for close monitoring.    A tertiary exam was completed which did not indicate any other injuries.    Traumatic SDH  Closed head injury  Ryder Conn was evaluated by Neurosurgery and was managed non-operatively.  He had repeat imaging of his head injury which showed stable subdural hematoma. He was monitored with serial neurological examinations which remained stable.  Neurosurgery recommends follow up in Neurosurgery clinic in 2 weeks with a repeat CT head. He was discharged with a 7 day course of Keppra for seizure prophylaxis.     He was evaluated by physical and occupational therapies during his hospitalization.  Patient underwent Traumatic Brain Injury evaluation and screening by therapies. He has been provided with information for TBI follow-up. Please see summary of most current therapy notes below.    ETOH misuse  Acute alcohol intoxication  The patient was screened for hazardous consumption of alcohol and dependence. His blood alcohol level was 0.41 on admission. He reported prior issues with alcohol misuse and was going  "to AAA meeting. However this has been on hold given COVID 19 pandemic.    Ryder Conn was placed on the CIWA-Ar protocol and monitored closely for withdrawal.  The correlation of ETOH use and accidents/injuries were discussed with the patient.  The importance of abstaining from ETOH use while healing from existing injuries after discharge was reviewed with the patient.    Therapy Recommendations:   Current status of physical therapies on discharge:   Current status: PT evaluation completed. Treatment indicated. B foot neuropathy. Brother \"Kenyon\" present. Both brother and pt declined cognition deficits - no apparent deficits noted during session. SBA for transfers and bed mobility for line management - reports baseline lightheadedness upon standing. Ambulated > 1,000 ft with engagement in balance challenges - very intermittent bouts of unsteadiness that pt safely corrected/adjusted. Negotiated 3 x 4 stairs with use of 1 rail - unsteady initially 2/2 impulsivity, improved after education/cues for safety. Educated fall prevention strategies. No concerns with safe discharge home. Recs up SBA.      Barriers to return to prior living situation: medical status  Recommendations for discharge: home  Rationale for recommendations: Did educate pt that if he starts to experience cognition deficits with higher level tasks (I.e. return to work) to notify his provider as pt would then benefit from OP OT. Recommend use of shoes for all mobility in home and outside 2/2 B foot neuropathy.     Pending Results     Unresulted Labs Ordered in the Past 30 Days of this Admission     Date and Time Order Name Status Description    9/17/2020 1423 SARS-CoV-2 COVID-19 Virus (Coronavirus) RT-PCR In process         Code Status   Full Code    SUBJECTIVE: The discharge instructions and follow up care was discussed with the patient. Prior to discharge he was able to tolerate a meal reported mild headache manageable without " analgesics.    Physical Exam   Temp: 98.7  F (37.1  C) Temp src: Oral BP: (!) 139/103 Pulse: 96   Resp: 20 SpO2: 96 % O2 Device: None (Room air) Oxygen Delivery: 1 LPM  Vitals:    09/17/20 1500 09/18/20 0400   Weight: 93.4 kg (206 lb) 108.7 kg (239 lb 10.2 oz)     Vital Signs with Ranges  Temp:  [97.8  F (36.6  C)-98.9  F (37.2  C)] 98.7  F (37.1  C)  Pulse:  [] 96  Resp:  [14-22] 20  BP: (118-164)/() 139/103  SpO2:  [90 %-100 %] 96 %  I/O last 3 completed shifts:  In: 867.5 [I.V.:867.5]  Out: 600 [Urine:600]     Physical Exam  HENT:      Head: Normocephalic.      Nose: Nose normal.      Mouth: Mucous membranes are moist.     Eyes:      Pupils: Pupils are equal, round, and reactive to light.   Neck:      Musculoskeletal: Normal range of motion.   Cardiovascular:      Rate and Rhythm: Normal rate and regular rhythm.      Heart sounds: No murmur.   Pulmonary:      Effort: Pulmonary effort is normal.   Abdominal:      General: Abdomen is flat.      Palpations: Abdomen is soft.      Tenderness: There is no abdominal tenderness.   Musculoskeletal: Normal range of motion.   Skin:     General: Skin is warm and dry.      Capillary Refill: Capillary refill takes less than 2 seconds.   Neurological:      General: No focal deficit present.      Mental Status: He is alert and oriented to person, place, and time.   Psychiatric:         Mood and Affect: Mood normal.         Behavior: Behavior normal.     Discharge Disposition   Discharged to home  Condition at discharge: Stable  Discharge VS: Blood pressure (!) 139/103, pulse 96, temperature 98.7  F (37.1  C), temperature source Oral, resp. rate 20, weight 108.7 kg (239 lb 10.2 oz), SpO2 96 %.    Consultations This Hospital Stay   PHYSICAL THERAPY ADULT IP CONSULT  OCCUPATIONAL THERAPY ADULT IP CONSULT    Discharge Orders      CONCUSSION  REFERRAL      Reason for your hospital stay    Traumatic subdural hematoma  Closed head injury  Acute alcohol intoxication      Adult [facility] Follow-up and recommended labs and tests    Follow up with your primary care provider for continued medical care and hospital follow up in 5-10 days.    Neurosurgery Clinic: in 2 weeks with Cinda Clayton with a repeat head CT   [facility]  [address]   [address] [place] [address]  Appointments: [phone]    You have been involved in a recent trauma incident resulting in an injury.  Studies show us that people affected by trauma have higher levels of post-traumatic stress disorder (PTSD) and/or depressive symptoms during the year following an injury.     Please consider the following.  Have you:  Had migraines about the event(s) or thought about the event(s) when you didn t want to?  Tried hard not to think about the event(s) or went out of your way to avoid situations that reminded you of the event(s)?  Been constantly on guard, watchful, or easily startled?  Felt numb or detached from people, activities, or your surroundings?   Felt guilty or unable to stop blaming yourself or others for the event(s) or any problems the event (s) may have caused?    If you answered  yes  to 3 or more of these questions, or if you simply want to discuss any of your feelings further, we recommend that you talk with your Primary Care Provider or a mental health professional.      Appointments on [place] and/or [facility] (with [facility] or [facility] provider or service). Call [phone] if you haven't heard regarding these appointments within 7 days of discharge.     Activity    Your activity upon discharge: activity as tolerated     When to contact your care team    Call your primary doctor if you have any of the following: temperature greater than 101.5, increased pain or headache, changes in vision or weakness of one side of your extremity     Diet    Follow this diet upon discharge: Regular     Discharge Medications   Current Discharge Medication List      START taking these  medications    Details   acetaminophen (TYLENOL) 325 MG tablet Take 2 tablets (650 mg) by mouth every 4 hours as needed for mild pain  Qty:      Associated Diagnoses: SDH (subdural hematoma) (H)      folic acid (FOLVITE) 1 MG tablet Take 1 tablet (1 mg) by mouth daily  Qty: 30 tablet, Refills: 0    Associated Diagnoses: Alcoholic intoxication with complication (H)      levETIRAcetam (KEPPRA) 500 MG tablet Take 1 tablet (500 mg) by mouth every 12 hours  Qty: 14 tablet, Refills: 0    Associated Diagnoses: SDH (subdural hematoma) (H)      thiamine (B-1) 100 MG tablet Take 1 tablet (100 mg) by mouth daily  Qty: 30 tablet, Refills: 0    Associated Diagnoses: Alcoholic intoxication with complication (H)           Allergies   Not on File  Data   Most Recent 3 CBC's:  Recent Labs   Lab Test 09/18/20  0430 09/17/20  1228   WBC 11.2* 5.5   HGB 14.4 15.4   MCV 90 90    202      Most Recent 3 BMP's:  Recent Labs   Lab Test 09/18/20  1038 09/18/20  0430 09/17/20  1228   NA  --  138 145*   POTASSIUM 3.8 3.1* 3.1*   CHLORIDE  --  105 112*   CO2  --  24 26   BUN  --  6* 4*   CR  --  0.50* 0.70   ANIONGAP  --  9 6   GHAZAL  --  8.6 8.7   GLC  --  104* 120*     Most Recent 2 LFT's:  Recent Labs   Lab Test 09/17/20  1228   AST 72*   ALT 58   ALKPHOS 81   BILITOTAL 0.4     Most Recent INR's and Anticoagulation Dosing History:  Anticoagulation Dose History     Recent Dosing and Labs Latest Ref Rng & Units 9/17/2020 9/18/2020    INR 0.86 - 1.14 1.10 1.14        Most Recent 3 Troponin's:No lab results found.  Most Recent 6 Bacteria Isolates From Any Culture (See EPIC Reports for Culture Details):No lab results found.  Most Recent TSH, T4 and A1c Labs:  Recent Labs   Lab Test 09/17/20  1228   TSH 2.39     Results for orders placed or performed during the hospital encounter of 09/17/20   CT Head w/o Contrast    Narrative    CT HEAD W/O CONTRAST 9/17/2020 1:17 PM    History: altered mental status   ICD-10:    Comparison:  None    Technique: Using multidetector thin collimation helical acquisition  technique, axial, coronal and sagittal CT images from the skull base  to the vertex were obtained without intravenous contrast.    Findings: There is a hyperdense subdural hemorrhage overlying the left  temporoparietal convexity that measures up to 7 mm. There is no  significant mass effect, or midline shift. Gray/white matter  differentiation in both cerebral hemispheres is preserved. Ventricles  are proportionate to the cerebral sulci. The basal cisterns are clear.    The bony calvaria and the bones of the skull base are normal. There is  a hematoma overlying the right frontoparietal scalp without evidence  of underlying osseous abnormality. The visualized portions of the  paranasal sinuses and mastoid air cells are clear.       Impression    Impression:  Left-sided temporoparietal acute subdural hemorrhage measuring up to 7  mm extending inferiorly to the tentorium. Right frontoparietal scalp  hematoma is also visualized suggesting a coup-contrecoup mechanism of  injury.     Acute findings were discussed with neuroradiology fellow and  communicated with ED provider at 1:19 PM and 9/17/2020    I have personally reviewed the examination and initial interpretation  and I agree with the findings.    MADAN ORTIZ MD   XR Chest Port 1 View    Narrative    Exam: XR CHEST PORT 1 VW, 9/17/2020 3:00 PM    Indication: altered mental status    Comparison: None    Findings:   Portable radiograph of the chest. Cardiac silhouette is not enlarged.  Streaky left basilar airspace opacities. No pneumothorax. No pleural  effusion. Upper abdomen is unremarkable. No acute osseous  abnormalities.      Impression    Impression: Subtle streaky left basilar airspace opacities, favor  atelectasis over infiltrate.     I have personally reviewed the examination and initial interpretation  and I agree with the findings.    DAVEY VAZQUEZ MD   Head CT w/o contrast     Narrative    Noncontrast head CT 9/17/2020 4:58 PM    History: Known intracranial hemorrhage, repeat CT for neurosurgery  recommendation.     Comparison: Same day CT    Technique: Using multidetector thin collimation helical acquisition  technique, axial, coronal and sagittal CT images from the skull base  to the vertex were obtained without intravenous contrast.    Findings: Stable size and appearance of the left temporal parietal  subdural hematoma which measures approximately 7 mm maximum thickness.  Small amount of subdural blood along the left tentorium cerebellum.  Small amount of subarachnoid hemorrhage filling the left temporal  cerebral sulci, similar but slightly more conspicuous compared with  same day CT (series 7 image [id number]).    There is no midline shift. No new intracranial hemorrhage. Gray/white  matter differentiation in both cerebral hemispheres is preserved.  Ventricles are proportionate to the cerebral sulci. The basal cisterns  are clear. Right parietal scalp hematoma unchanged.    Nondisplaced right zygomatic arch fracture. Visualized portions of the  paranasal sinuses and mastoid air cells are clear.       Impression    Impression:   1. Stable left temporal parietal subdural hematoma, again measures  approximately 7 mm in maximum thickness.  2. Small amount of subarachnoid hemorrhage filling the left temporal  sulci, grossly stable but appears more conspicuous in the current  study compared with same day previous CT, likely from technical  reasons.  3. Stable nondisplaced right zygomatic arch fracture.  4. Stable right scalp hematoma.         I have personally reviewed the examination and initial interpretation  and I agree with the findings.    EDISON POP MD   CT Cervical Spine w/o Contrast    Narrative    CT CERVICAL SPINE W/O CONTRAST 9/17/2020 5:03 PM    Provided History: head trauma, altered    Comparison: None    Technique: Using multidetector thin collimation helical  acquisition  technique, axial, coronal and sagittal CT images through the cervical  spine were obtained without intravenous contrast.     Findings:  The cervical vertebrae are normally aligned. Normal cervical lordosis.  No acute fracture or subluxation. No prevertebral edema. There is no  disc height narrowing at any level. Multilevel degenerative changes  throughout the cervical spine, greatest at C6-7 where there is severe  left and mild/moderate right neural foraminal stenosis secondary to  osseous spurring. A peculiar left vertebral artery groove is noted in  the left aspect of the C5 vertebral body, a normal variation.  No abnormality of the paraspinous soft tissues.      Impression    Impression:   1.  No acute fracture or traumatic subluxation.  2.  Multilevel degenerative changes of the cervical spine, greatest at  C6-7 where there is severe left and mild/moderate right neural  foraminal stenosis.    I have personally reviewed the examination and initial interpretation  and I agree with the findings.    EDISON POP MD       Time Spent on this Encounter   I, PATTIE Persaud CNP, personally saw the patient today and spent greater than 30 minutes discharging this patient.    We appreciate the opportunity to care for your patient while in the hospital.  Should you have any questions about their injuries or this discharge summary our contact information is below.    Trauma Services  [facility]   Department of Critical Care and Acute Care Surgery  [address] [address]  Office: [phone]

## 2020-09-18 NOTE — PROGRESS NOTES
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   Functional Level Prior   Ambulation 0-->independent   Transferring 0-->independent   Toileting 0-->independent   Bathing 0-->independent   Communication 0-->understands/communicates without difficulty   Swallowing 0-->swallows foods/liquids without difficulty   Cognition 0 - no cognition issues reported   Fall history within last six months yes   Number of times patient has fallen within last six months 1   Which of the above functional risks had a recent onset or change? fall history   Prior Functional Level Comment IND at baseline   General Information   Onset of Illness/Injury or Date of Surgery - Date 09/17/20   Referring Physician Candie Aceves APRN CNP    Patient/Family Goals Statement return to work   Pertinent History of Current Problem (include personal factors and/or comorbidities that impact the POC) Per chart: \"50yr old with a PMH of alcohol abuse who was found down at work altered. A CT scan of his head was obtained which showed a 7mm left tempo parietal SDH and a BAL of 0.4.\"   Precautions/Limitations fall precautions   General Info Comments Activity: up with assist   Cognitive Status Examination   Orientation orientation to person, place and time   Level of Consciousness alert   Follows Commands and Answers Questions 75% of the time  (behavioral )   Personal Safety and Judgment at risk behaviors demonstrated   Memory intact   Cognitive Comment Brother present to confirm. Pt able to multi task. Declines cognition changes - able to " "focus, no memory deficits.   Pain Assessment   Patient Currently in Pain No   Integumentary/Edema   Integumentary/Edema Comments L occipital bruising   Range of Motion (ROM)   ROM Comment WFL   Strength   Strength Comments WFL   Bed Mobility   Bed Mobility Comments SBA   Transfer Skills   Transfer Comments SBA   Gait   Gait Comments CGA-SBA unsupported   Balance   Balance Comments Impaired higher level balance deficits (baseline) - pt able to safely adjust with lateral or slowing of gait   Sensory Examination   Sensory Perception Comments B foot neuropathy   General Therapy Interventions   Planned Therapy Interventions balance training;transfer training;risk factor education;home program guidelines;progressive activity/exercise;gait training   Clinical Impression   Criteria for Skilled Therapeutic Intervention yes, treatment indicated   PT Diagnosis Impaired functional mobility   Influenced by the following impairments balance, neuropathy, behavior, impulsivity   Functional limitations due to impairments falls risk   Clinical Presentation Stable/Uncomplicated   Clinical Presentation Rationale clinical judgement and chart review   Clinical Decision Making (Complexity) Low complexity   Therapy Frequency 2x/week   Predicted Duration of Therapy Intervention (days/wks) days   Anticipated Discharge Disposition Home   Risk & Benefits of therapy have been explained Yes   Patient, Family & other staff in agreement with plan of care Yes   [facility] [employer] TM \"6 Clicks\"   2016, Trustees of [facility], under license to [employer].  All rights reserved.   6 Clicks Short Forms Basic Mobility Inpatient Short Form   [facility]  \"6 Clicks\" V.2 Basic Mobility Inpatient Short Form   1. Turning from your back to your side while in a flat bed without using bedrails? 4 - None   2. Moving from lying on your back to sitting on the side of a flat bed without using bedrails? 4 - None   3. Moving to and from a " bed to a chair (including a wheelchair)? 3 - A Little   4. Standing up from a chair using your arms (e.g., wheelchair, or bedside chair)? 3 - A Little   5. To walk in hospital room? 3 - A Little   6. Climbing 3-5 steps with a railing? 3 - A Little   Basic Mobility Raw Score (Score out of 24.Lower scores equate to lower levels of function) 20   Total Evaluation Time   Total Evaluation Time (Minutes) 9

## 2020-09-18 NOTE — PLAN OF CARE
Discharged to: Home at 1335 with brother.   Belongings: Cell phone, clothing sent with pt and brother.     AVS (After Visit Summary) discussed with: patient and family (brother) Kenyon. All questions were answered and discussed in detail.     Pt is neurologically intact w/ no deficits noted besides a slight HA and feeling 'groggy', needs glasses at baseline but were not brought to the hospital, will follow up out pt per pt. Hemodynamically stable. PT saw pt and has no issues with pt returning to previous living situation. Educated on out pt options for alcohol abuse. No other concerns or complaints at this time. For full assessment details, so flowsheets.

## 2020-09-18 NOTE — PLAN OF CARE
Major Shift Events:  Pt A&Ox3 and becoming more oriented over night. Now A&Ox4, calm cooperative, logical and following commands. Neurologically intact. C/o slight, tolerable headache that is unchanged over night.Standing at bedside with SBA and pt denies dizziness. CIWA score 2 throughout the night. ST 's at the beginning of the shift and now low 100's. He is voiding spontaneously. No other concerns       Plan:Ctm s/s of withdrawal. Ctm neuro changes. Reorientation as needed, Replace electrolytes as needed  For vital signs and complete assessments, please see documentation flowsheets.

## 2020-09-18 NOTE — PROGRESS NOTES
SURGICAL ICU PROGRESS NOTE  September 18, 2020      CO-MORBIDITIES:   Subdural hemorrhage (H)  Closed head injury, initial encounter  Alcoholic intoxication with complication (H)    ASSESSMENT: Ryder Conn is a 50 year old male with no PMH, found down at work and unresponsive, BIBA with altered mental status (combative) and alcoholic intoxication, found to have left temporoparietal acute subdural hemorrhage.      PLAN:   Neuro/ pain/ sedation:  # Left SDH   # Acute alcohol intoxication  #Altered mental status  -Monitor neurological status. Notify the MD/DO for any acute changes in exam.  - Hold all anticoagulation  - C-spine cleared  - Keppra 500 BID for seizure prophylaxis  - CIWA protocol (ethanol 0.41 at admission)- avoid benzodiazepines  -Gabapentin carlito, APAP and oxy PRN for pain (no PRNs).  - Agitation: PRN haldol (no PRNs), carlito clonidine and gabapentin     Pulmonary care:   -Supplemental oxygen to keep saturation above 92 %.      Cardiovascular:    -Monitor hemodynamic status.   -SBP goal <140  - PRN labetalol  - SBP 140s/100s (MAPs 100s)  - HR 100s     GI care:   -Regular  diet  -Bowel regimen: PRN dulcolax, senna  - N/V: PRN reglan      Fluids/ Electrolytes/ Nutrition:   # Hypokalemia  #Hypophosphatemia  - Na 138, K 3.1 Ca 8.6 Mg 1.5 Phos 3.5  - Mg, Phos, K replacement yesterday  -75 ml/hr LR for IV fluid hydration - discontinue to TKO  -Electrolyte replacement protocol  -No indication for parenteral nutrition.  - Folic acid/thiamine     Renal/ Fluid Balance:    - UOP adequate 1cc/kg/hr  -Will continue to monitor intake and output.      Endocrine:    -No management indication.  - BG 100s-150s      ID/ Antibiotics:  -No indication for antibiotics.   -WBC 11.2 (prev 5.5), afebrile      Heme:     -Hemoglobin 14.4 (15.4)  - Plt 197 (202)  - Transfuse for Hb <7      Prophylaxis:    -Mechanical prophylaxis for DVT.  -No chemical DVT prophylaxis due to high risk of bleeding      MSK:  - PT/OT      Lines/  "tubes/ drains:  -PIVs      Disposition:  -Transfer to floor/discharge to home     CODE:  Full    ====================================    TODAY'S PROGRESS:   SUBJECTIVE:   Endorses mild left sided headache and feeling \"hazy\". Denies any blurry vision, N/V, numbness or weakness. Has not had BM this am.     OBJECTIVE:   1. VITAL SIGNS:   Temp:  [97.8  F (36.6  C)-98.5  F (36.9  C)] 98  F (36.7  C)  Pulse:  [] 100  Resp:  [14-22] 20  BP: (118-147)/() 145/94  SpO2:  [90 %-100 %] 95 %  Resp: 20      2. INTAKE/ OUTPUT:   I/O last 3 completed shifts:  In: 67.5 [I.V.:67.5]  Out: 100 [Urine:100]    3. PHYSICAL EXAMINATION:   General: Alert, in no acute distress,.  HEENT: Normocephalic. Left periorbital ecchymosis. Patent nares.   Neuro: AOx4, PEERLA, EOMI, symmetrical facial expression, tongue midline. Strength 5/5 throughout extremities, sensation intact to light touch.  Neck: No cervical lymphadenopathy.   Chest wall: Symmetric thorax. No masses or tenderness to palpation.   Respiratory: Non-labored breathing. Lung sounds clear to auscultation bilaterally.  Cardiovascular: Regular rate and rhythm.   Gastrointestinal: Abdomen soft, non-distended, non-tender to palpation. No organomegaly or masses appreciated. No inguinal hernias.   Extremities: Moving all four extremities. No limb deformities. No pedal edema.  Skin: As noted above. No rashes or lesions appreciated.     4. INVESTIGATIONS:   Arterial Blood Gases   No lab results found in last 7 days.  Complete Blood Count   Recent Labs   Lab 09/18/20  0430 09/17/20  1228   WBC 11.2* 5.5   HGB 14.4 15.4    202     Basic Metabolic Panel  Recent Labs   Lab 09/18/20  0430 09/17/20  1228    145*   POTASSIUM 3.1* 3.1*   CHLORIDE 105 112*   CO2 24 26   BUN 6* 4*   CR 0.50* 0.70   * 120*     Liver Function Tests  Recent Labs   Lab 09/18/20  0430 09/17/20  1228   AST  --  72*   ALT  --  58   ALKPHOS  --  81   BILITOTAL  --  0.4   ALBUMIN  --  3.9   INR " 1.14 1.10     Pancreatic Enzymes  No lab results found in last 7 days.  Coagulation Profile  Recent Labs   Lab 09/18/20  0430 09/17/20  1228   INR 1.14 1.10         5. RADIOLOGY:   Recent Results (from the past 24 hour(s))   CT Head w/o Contrast    Narrative    CT HEAD W/O CONTRAST 9/17/2020 1:17 PM    History: altered mental status   ICD-10:    Comparison: None    Technique: Using multidetector thin collimation helical acquisition  technique, axial, coronal and sagittal CT images from the skull base  to the vertex were obtained without intravenous contrast.    Findings: There is a hyperdense subdural hemorrhage overlying the left  temporoparietal convexity that measures up to 7 mm. There is no  significant mass effect, or midline shift. Gray/white matter  differentiation in both cerebral hemispheres is preserved. Ventricles  are proportionate to the cerebral sulci. The basal cisterns are clear.    The bony calvaria and the bones of the skull base are normal. There is  a hematoma overlying the right frontoparietal scalp without evidence  of underlying osseous abnormality. The visualized portions of the  paranasal sinuses and mastoid air cells are clear.       Impression    Impression:  Left-sided temporoparietal acute subdural hemorrhage measuring up to 7  mm extending inferiorly to the tentorium. Right frontoparietal scalp  hematoma is also visualized suggesting a coup-contrecoup mechanism of  injury.     Acute findings were discussed with neuroradiology fellow and  communicated with ED provider at 1:19 PM and 9/17/2020    I have personally reviewed the examination and initial interpretation  and I agree with the findings.    MADAN ORTIZ MD   XR Chest Port 1 View    Narrative    Exam: XR CHEST PORT 1 VW, 9/17/2020 3:00 PM    Indication: altered mental status    Comparison: None    Findings:   Portable radiograph of the chest. Cardiac silhouette is not enlarged.  Streaky left basilar airspace opacities. No  pneumothorax. No pleural  effusion. Upper abdomen is unremarkable. No acute osseous  abnormalities.      Impression    Impression: Subtle streaky left basilar airspace opacities, favor  atelectasis over infiltrate.     I have personally reviewed the examination and initial interpretation  and I agree with the findings.    DAVEY VAZQUEZ MD   Head CT w/o contrast    Narrative    Noncontrast head CT 9/17/2020 4:58 PM    History: Known intracranial hemorrhage, repeat CT for neurosurgery  recommendation.     Comparison: Same day CT    Technique: Using multidetector thin collimation helical acquisition  technique, axial, coronal and sagittal CT images from the skull base  to the vertex were obtained without intravenous contrast.    Findings: Stable size and appearance of the left temporal parietal  subdural hematoma which measures approximately 7 mm maximum thickness.  Small amount of subdural blood along the left tentorium cerebellum.  Small amount of subarachnoid hemorrhage filling the left temporal  cerebral sulci, similar but slightly more conspicuous compared with  same day CT (series 7 image [id number]).    There is no midline shift. No new intracranial hemorrhage. Gray/white  matter differentiation in both cerebral hemispheres is preserved.  Ventricles are proportionate to the cerebral sulci. The basal cisterns  are clear. Right parietal scalp hematoma unchanged.    Nondisplaced right zygomatic arch fracture. Visualized portions of the  paranasal sinuses and mastoid air cells are clear.       Impression    Impression:   1. Stable left temporal parietal subdural hematoma, again measures  approximately 7 mm in maximum thickness.  2. Small amount of subarachnoid hemorrhage filling the left temporal  sulci, grossly stable but appears more conspicuous in the current  study compared with same day previous CT, likely from technical  reasons.  3. Stable nondisplaced right zygomatic arch fracture.  4. Stable right scalp  hematoma.         I have personally reviewed the examination and initial interpretation  and I agree with the findings.    EDISON POP MD   CT Cervical Spine w/o Contrast    Narrative    CT CERVICAL SPINE W/O CONTRAST 9/17/2020 5:03 PM    Provided History: head trauma, altered    Comparison: None    Technique: Using multidetector thin collimation helical acquisition  technique, axial, coronal and sagittal CT images through the cervical  spine were obtained without intravenous contrast.     Findings:  The cervical vertebrae are normally aligned. Normal cervical lordosis.  No acute fracture or subluxation. No prevertebral edema. There is no  disc height narrowing at any level. Multilevel degenerative changes  throughout the cervical spine, greatest at C6-7 where there is severe  left and mild/moderate right neural foraminal stenosis secondary to  osseous spurring. A peculiar left vertebral artery groove is noted in  the left aspect of the C5 vertebral body, a normal variation.  No abnormality of the paraspinous soft tissues.      Impression    Impression:   1.  No acute fracture or traumatic subluxation.  2.  Multilevel degenerative changes of the cervical spine, greatest at  C6-7 where there is severe left and mild/moderate right neural  foraminal stenosis.    I have personally reviewed the examination and initial interpretation  and I agree with the findings.    EDISON POP MD       =========================================

## 2020-09-20 ENCOUNTER — PATIENT OUTREACH (OUTPATIENT)
Dept: CARE COORDINATION | Facility: CLINIC | Age: 50
End: 2020-09-20

## 2020-09-21 NOTE — PROGRESS NOTES
[facility]: Post-Discharge Note  SITUATION                                                      Admission:    Admission Date: 09/17/20   Reason for Admission: Closed head injury,  Discharge:   Discharge Date: 09/18/20  Discharge Diagnosis: Closed head injury,    BACKGROUND                                                      Mr. Ryder Conn is a 50yr old male with a PMH of alcohol misuse who was brought to the ED via EMS after being found down at work altered. On admission he was combative requiring a dose of Ketamine in order to obtain a CT scan of his head. The CT indicated a left 7mm tempo parietal SDH. Of note his BAL was 0.41. He was admitted to the SICU for close monitoring.       ASSESSMENT      Discharge Assessment  Patient reports symptoms are: Improved  Does the patient have all of their medications?: Yes  Does patient know what their new medications are for?: Yes  Does patient have a follow-up appointment scheduled?: Yes  Does patient have any other questions or concerns?: No    Post-op  Did the patient have surgery or a procedure: No  Fever: No  Chills: No  Eating & Drinking: eating and drinking without complaints/concerns  PO Intake: regular diet  Bowel Function: normal  Urinary Status: voiding without complaint/concerns        PLAN                                                      Outpatient Plan:  Follow up with your primary care provider for continued medical care and hospital follow up in 5-10 days    No future appointments.        Adia Victoria, CMA

## 2020-09-24 ENCOUNTER — TELEPHONE (OUTPATIENT)
Dept: NEUROSURGERY | Facility: CLINIC | Age: 50
End: 2020-09-24

## 2020-09-25 NOTE — PROGRESS NOTES
Order(s) created erroneously. Erroneous order ID: [id number]   Order canceled by: CLARENCE REEDER   Order cancel date/time: 09/25/2020 11:41 AM

## 2020-09-25 NOTE — PROGRESS NOTES
Order(s) created erroneously. Erroneous order ID: [id number]   Order canceled by: CLARENCE REEDER   Order cancel date/time: 09/25/2020 11:39 AM

## 2020-09-25 NOTE — PROGRESS NOTES
Order(s) created erroneously. Erroneous order ID: [id number]   Order canceled by: CLARENCE REEDER   Order cancel date/time: 09/25/2020 11:40 AM

## 2020-09-25 NOTE — PROGRESS NOTES
Order(s) created erroneously. Erroneous order ID: [id number]   Order canceled by: CLARENCE REEDER   Order cancel date/time: 09/25/2020 11:38 AM

## 2020-09-28 NOTE — TELEPHONE ENCOUNTER
[facility]    Phone Message    May a detailed message be left on voicemail: yes     Reason for Call: Other: Patient returning a call to schedule a follow up. Patient stated he was having phone issues and was unable to answer but is following up. Patient was given imaging department to schedule CT.     Please advise and call patient back    Action Taken: Other:  NEUROSURGERY     Travel Screening: Not Applicable

## 2020-09-30 ENCOUNTER — TELEPHONE (OUTPATIENT)
Dept: NEUROSURGERY | Facility: CLINIC | Age: 50
End: 2020-09-30

## 2020-09-30 ENCOUNTER — ANCILLARY PROCEDURE (OUTPATIENT)
Dept: CT IMAGING | Facility: CLINIC | Age: 50
End: 2020-09-30
Attending: STUDENT IN AN ORGANIZED HEALTH CARE EDUCATION/TRAINING PROGRAM
Payer: COMMERCIAL

## 2020-09-30 DIAGNOSIS — S06.5XAA SUBDURAL HEMATOMA (H): ICD-10-CM

## 2020-09-30 NOTE — TELEPHONE ENCOUNTER
[facility]    Phone Message    May a detailed message be left on voicemail: yes     Reason for Call: Other: Ryder calling due to a missed call. Please call him back at your earliest convenience.     Action Taken: Message routed to:  [facility] ([facility] NEUROSURGERY    Travel Screening: Not Applicable

## 2020-10-02 DIAGNOSIS — S06.5XAA SUBDURAL HEMATOMA (H): Primary | ICD-10-CM

## 2020-10-06 ENCOUNTER — PRE VISIT (OUTPATIENT)
Dept: NEUROSURGERY | Facility: CLINIC | Age: 50
End: 2020-10-06

## 2020-10-06 ENCOUNTER — TELEPHONE (OUTPATIENT)
Dept: NEUROSURGERY | Facility: CLINIC | Age: 50
End: 2020-10-06

## 2020-10-06 NOTE — TELEPHONE ENCOUNTER
Writer left voicemail x3 for patient to return call.   Provider wants CT prior to appointment.     No response from patient.     Eden Gagnon LPN  Neurosurgery

## 2020-10-06 NOTE — TELEPHONE ENCOUNTER
FUTURE VISIT INFORMATION      FUTURE VISIT INFORMATION:    Date: 10/9/2020    Time: 830am    Location: [facility]  REFERRAL INFORMATION:    Referring provider:      Referring providers clinic:      Reason for visit/diagnosis      RECORDS REQUESTED FROM:       Clinic name Comments Records Status Imaging Status   Internal ED Visit 9/17/2020    CT Head 9/17/2020, 9/3/0/2020    CT Cervical Spine 9/17/2020 Epic PACS

## 2020-10-09 ENCOUNTER — VIRTUAL VISIT (OUTPATIENT)
Dept: NEUROSURGERY | Facility: CLINIC | Age: 50
End: 2020-10-09
Payer: COMMERCIAL

## 2020-10-09 DIAGNOSIS — S06.5XAA SUBDURAL HEMATOMA (H): Primary | ICD-10-CM

## 2020-10-09 PROCEDURE — 99203 OFFICE O/P NEW LOW 30 MIN: CPT | Mod: 95 | Performed by: PHYSICIAN ASSISTANT

## 2020-10-09 NOTE — PATIENT INSTRUCTIONS
You will need a follow up head CT within the next 1 week. I will call you with results and discuss need for further follow up.

## 2020-10-09 NOTE — LETTER
"10/9/2020       RE: Ryder Conn  [address] [place] [id number]     Dear Colleague,    Thank you for referring your patient, Ryder Conn, to the [facility] [place] [facility]. Please see a copy of my visit note below.    Ryder Conn is a 50 year old male who is being evaluated via a billable telephone visit.      The patient has been notified of following:     \"This telephone visit will be conducted via a call between you and your physician/provider. We have found that certain health care needs can be provided without the need for a physical exam.  This service lets us provide the care you need with a short phone conversation.  If a prescription is necessary we can send it directly to your pharmacy.  If lab work is needed we can place an order for that and you can then stop by our lab to have the test done at a later time.    Telephone visits are billed at different rates depending on your insurance coverage. During this emergency period, for some insurers they may be billed the same as an in-person visit.  Please reach out to your insurance provider with any questions.    If during the course of the call the physician/provider feels a telephone visit is not appropriate, you will not be charged for this service.\"    Patient has given verbal consent for Telephone visit?  YES    What phone number would you like to be contacted at? [phone]    How would you like to obtain your AVS? [email or web address]    Phone call duration: 15 minutes    PREETHI Julio          [facility]      Name: Ryder Conn  MRN: [id number]  Age: 50 year old  : 1970  10/09/2020      Chief Complaint:   Follow up, subdural hematoma    History of Present Illness:   Ryder Conn is a pleasant 50 year old male with a history of alcohol abuse, seen today via telephone visit for hospital follow " up. He was brought to the ED on 9/17/2020 after being found down at work altered.  A head CT done in the ED revealed a left tempo parietal subdural hematoma measuring 7mm. His BAL was 0.4. He was admitted to SICU for monitoring. He was managed non-operatively by Neurosurgery while in the hospital, and follow up scan remained stable. He was discharged home on 9/18/2020. He had a follow up head CT on 9/30/2020 which shows slight increase in the left subdural hematoma, now measuring 10 mm without midline shift and no signs of new blood.   Today he reports he's feeling fairly well. He had quite severe headaches the first week following his discharge but these have improved. He still has a mild headache some days when he wakes up but this resolves quickly. He is back to work and that's going well. He had some nausea the first few days after being home but nothing recent. He denies fever, chills, vision or hearing changes, dizziness, vertigo, nausea, vomiting, paresthesias or weakness. He notes that he's had neck and jaw stiffness, has figured this was likely due to falling though has no recollection of the fall so is uncertain if that's true. He's been taking occasional ibuprofen both for the headaches and myalgias.     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   He denies current ETOH use.    Review of Systems:   Pertinent items are noted in HPI or as in patient entered ROS below, remainder of complete ROS is negative.     Physical Exam limited by telephone visit:  Neuro: The patient is fully oriented and quite pleasant. Speech is normal.   Psych: Normal mood and affect.      Imaging:  We reviewed the CT results, done 9/30, over the phone which shows slight increase in the left tempo parietal subdural hematoma without midline shift. No evidence of new bleeding seen.      Assessment:  Left subdural hematoma, follow up    Plan:  Given the slight increase in his bleed on follow up scan I would  like him to have another CT done within the next week to evaluate progression. He will do this ASAP and I'll follow up with him by phone with the results to determine plan going forward. I asked him to use tylenol instead of ibuprofen for pain until we get his new head CT results back. He should return to the ED or call immediately in the interim with any increased headaches or new vomiting, paresthesias, weakness, or other neurological changes.        Hannah Sanchez PA-C          Again, thank you for allowing me to participate in the care of your patient.      Sincerely,    Hannah Sanchez PA-C

## 2020-10-09 NOTE — LETTER
10/9/2020       RE: Ryder Conn  [address] [facility] [id number]     Dear Colleague,    Thank you for referring your patient, Ryder Conn, to the [facility] NEUROSURGERY CLINIC [place] at [facility]. Please see a copy of my visit note below.    [facility]    Name: Ryder Conn  MRN: [id number]  Age: 50 year old  : 1970  10/09/2020      Chief Complaint:   Follow up, subdural hematoma    History of Present Illness:   Ryder Conn is a pleasant 50 year old male with a history of alcohol abuse, seen today via telephone visit for hospital follow up. He was brought to the ED on 2020 after being found down at work altered.  A head CT done in the ED revealed a left tempo parietal subdural hematoma measuring 7mm. His BAL was 0.4. He was admitted to SICU for monitoring. He was managed non-operatively by Neurosurgery while in the hospital, and follow up scan remained stable. He was discharged home on 2020. He had a follow up head CT on 2020 which shows slight increase in the left subdural hematoma, now measuring 10 mm without midline shift and no signs of new blood.   Today he reports he's feeling fairly well. He had quite severe headaches the first week following his discharge but these have improved. He still has a mild headache some days when he wakes up but this resolves quickly. He is back to work and that's going well. He had some nausea the first few days after being home but nothing recent. He denies fever, chills, vision or hearing changes, dizziness, vertigo, nausea, vomiting, paresthesias or weakness. He notes that he's had neck and jaw stiffness, has figured this was likely due to falling though has no recollection of the fall so is uncertain if that's true. He's been taking occasional ibuprofen both for the headaches and myalgias.     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   He denies current ETOH use.    Review of Systems:   Pertinent items are noted in HPI or as in patient entered ROS below, remainder of complete ROS is negative.     Physical Exam limited by telephone visit:  Neuro: The patient is fully oriented and quite pleasant. Speech is normal.   Psych: Normal mood and affect.      Imaging:  We reviewed the CT results, done 9/30, over the phone which shows slight increase in the left tempo parietal subdural hematoma without midline shift. No evidence of new bleeding seen.      Assessment:  Left subdural hematoma, follow up    Plan:  Given the slight increase in his bleed on follow up scan I would like him to have another CT done within the next week to evaluate progression. He will do this ASAP and I'll follow up with him by phone with the results to determine plan going forward. I asked him to use tylenol instead of ibuprofen for pain until we get his new head CT results back. He should return to the ED or call immediately in the interim with any increased headaches or new vomiting, paresthesias, weakness, or other neurological changes.      Again, thank you for allowing me to participate in the care of your patient.  Sincerely,    Hannah Sanchez PA-C

## 2020-10-09 NOTE — PROGRESS NOTES
"Ryder Conn is a 50 year old male who is being evaluated via a billable telephone visit.      The patient has been notified of following:     \"This telephone visit will be conducted via a call between you and your physician/provider. We have found that certain health care needs can be provided without the need for a physical exam.  This service lets us provide the care you need with a short phone conversation.  If a prescription is necessary we can send it directly to your pharmacy.  If lab work is needed we can place an order for that and you can then stop by our lab to have the test done at a later time.    Telephone visits are billed at different rates depending on your insurance coverage. During this emergency period, for some insurers they may be billed the same as an in-person visit.  Please reach out to your insurance provider with any questions.    If during the course of the call the physician/provider feels a telephone visit is not appropriate, you will not be charged for this service.\"    Patient has given verbal consent for Telephone visit?  YES    What phone number would you like to be contacted at? [phone]    How would you like to [facility] your [facility]? [facility]    Phone call duration: 15 minutes    PREETHI Julio          [facility]      Name: Ryder Conn  MRN: [id number]  Age: 50 year old  : 1970  10/09/2020      Chief Complaint:   Follow up, subdural hematoma    History of Present Illness:   Ryder Conn is a pleasant 50 year old male with a history of alcohol abuse, seen today via telephone visit for hospital follow up. He was brought to the ED on 2020 after being found down at work altered.  A head CT done in the ED revealed a left tempo parietal subdural hematoma measuring 7mm. His BAL was 0.4. He was admitted to SICU for monitoring. He was managed non-operatively by Neurosurgery while in the hospital, and follow up scan " remained stable. He was discharged home on 9/18/2020. He had a follow up head CT on 9/30/2020 which shows slight increase in the left subdural hematoma, now measuring 10 mm without midline shift and no signs of new blood.   Today he reports he's feeling fairly well. He had quite severe headaches the first week following his discharge but these have improved. He still has a mild headache some days when he wakes up but this resolves quickly. He is back to work and that's going well. He had some nausea the first few days after being home but nothing recent. He denies fever, chills, vision or hearing changes, dizziness, vertigo, nausea, vomiting, paresthesias or weakness. He notes that he's had neck and jaw stiffness, has figured this was likely due to falling though has no recollection of the fall so is uncertain if that's true. He's been taking occasional ibuprofen both for the headaches and myalgias.     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   He denies current ETOH use.    Review of Systems:   Pertinent items are noted in HPI or as in patient entered ROS below, remainder of complete ROS is negative.     Physical Exam limited by telephone visit:  Neuro: The patient is fully oriented and quite pleasant. Speech is normal.   Psych: Normal mood and affect.      Imaging:  We reviewed the CT results, done 9/30, over the phone which shows slight increase in the left tempo parietal subdural hematoma without midline shift. No evidence of new bleeding seen.      Assessment:  Left subdural hematoma, follow up    Plan:  Given the slight increase in his bleed on follow up scan I would like him to have another CT done within the next week to evaluate progression. He will do this ASAP and I'll follow up with him by phone with the results to determine plan going forward. I asked him to use tylenol instead of ibuprofen for pain until we get his new head CT results back. He should return to the ED or  call immediately in the interim with any increased headaches or new vomiting, paresthesias, weakness, or other neurological changes.        Hannah Sanchez PA-C

## 2020-10-14 ENCOUNTER — ANCILLARY PROCEDURE (OUTPATIENT)
Dept: CT IMAGING | Facility: CLINIC | Age: 50
End: 2020-10-14
Attending: PHYSICIAN ASSISTANT
Payer: COMMERCIAL

## 2020-10-14 DIAGNOSIS — S06.5XAA SUBDURAL HEMATOMA (H): ICD-10-CM

## 2020-10-14 LAB — RADIOLOGIST FLAGS: ABNORMAL

## 2020-10-14 PROCEDURE — 70450 CT HEAD/BRAIN W/O DYE: CPT | Performed by: RADIOLOGY

## 2020-10-15 ENCOUNTER — TELEPHONE (OUTPATIENT)
Dept: NEUROSURGERY | Facility: CLINIC | Age: 50
End: 2020-10-15

## 2020-10-20 ENCOUNTER — VIRTUAL VISIT (OUTPATIENT)
Dept: NEUROSURGERY | Facility: CLINIC | Age: 50
End: 2020-10-20
Payer: COMMERCIAL

## 2020-10-20 DIAGNOSIS — S06.5XAA SUBDURAL HEMATOMA (H): Primary | ICD-10-CM

## 2020-10-20 PROCEDURE — 99213 OFFICE O/P EST LOW 20 MIN: CPT | Mod: 95 | Performed by: NEUROLOGICAL SURGERY

## 2020-10-20 NOTE — PROGRESS NOTES
Service Date: 10/20/2020      TELEPHONE VISIT      I had the pleasure to meet Antwan today over the telephone during a neurosurgical phone clinic visit.      Briefly, Antwan is a 50-year-old gentleman who last month hit his head and developed a left-sided subdural hematoma.  This was relatively asymptomatic, other than a small degree of headache.  On routine followup imaging, it showed that the subdural has gotten slightly larger and there does appear to be a new hemorrhage component to this more recently.      He was referred for possible middle meningeal embolization.      Today, I talked to him about this procedure, the risks and benefits of it.  At this point, he is inclined to continue to observe the subdural with repeat head CT and if it gets bigger on future CT, then to proceed with the middle meningeal embolization, but to wait for now.  I think this is very reasonable, and I will order a head CT for 3 weeks from now.  I would like to see him back in clinic at that time and then will further evaluate the potential for a middle meningeal embolization.      Overall, I spent approximately 20 minutes on the phone with Antwan, with the majority of this time spent in consultation and developing a treatment plan.         FLOR HAMMOND MD             D: 10/20/2020   T: 10/20/2020   MT: al      Name:     ODALIS VILLEDA   MRN:      [id number]        Account:      [id number]   :      1970           Service Date: 10/20/2020      Document: [id number]

## 2020-10-20 NOTE — LETTER
10/20/2020       RE: Odalis Villeda  [address] [place] [id number]     Dear Colleague,    Thank you for referring your patient, Odalis Villeda, to the [facility] NEUROSURGERY CLINIC [place] at [facility]. Please see a copy of my visit note below.    Odalis Villeda is a 50 year old male who is being evaluated via a billable telephone visit.          Service Date: 10/20/2020      TELEPHONE VISIT      I had the pleasure to meet Antwan today over the telephone during a neurosurgical phone clinic visit.      Briefly, Antwan is a 50-year-old gentleman who last month hit his head and developed a left-sided subdural hematoma.  This was relatively asymptomatic, other than a small degree of headache.  On routine followup imaging, it showed that the subdural has gotten slightly larger and there does appear to be a new hemorrhage component to this more recently.      He was referred for possible middle meningeal embolization.      Today, I talked to him about this procedure, the risks and benefits of it.  At this point, he is inclined to continue to observe the subdural with repeat head CT and if it gets bigger on future CT, then to proceed with the middle meningeal embolization, but to wait for now.  I think this is very reasonable, and I will order a head CT for 3 weeks from now.  I would like to see him back in clinic at that time and then will further evaluate the potential for a middle meningeal embolization.      Overall, I spent approximately 20 minutes on the phone with Antwan, with the majority of this time spent in consultation and developing a treatment plan.         FLOR HAMMOND MD             D: 10/20/2020   T: 10/20/2020   MT: al      Name:     ODALIS VILLEDA   MRN:      [id number]        Account:      [id number]   :      1970           Service Date: 10/20/2020      Document: [id number]        Again, thank you for allowing me  to participate in the care of your patient.      Sincerely,    Aramis Mccoy MD

## 2020-10-20 NOTE — PROGRESS NOTES
Ryder Adam Senia is a 50 year old male who is being evaluated via a billable telephone visit.

## 2020-10-20 NOTE — LETTER
10/20/2020       RE: Odalis Villeda  [address] [place] [id number]     Dear Colleague,    Thank you for referring your patient, Odalis Villeda, to the [facility] NEUROSURGERY CLINIC [place] at [facility]. Please see a copy of my visit note below.    Service Date: 10/20/2020      TELEPHONE VISIT      I had the pleasure to meet Antwan today over the telephone during a neurosurgical phone clinic visit.      Briefly, Antwan is a 50-year-old gentleman who last month hit his head and developed a left-sided subdural hematoma.  This was relatively asymptomatic, other than a small degree of headache.  On routine followup imaging, it showed that the subdural has gotten slightly larger and there does appear to be a new hemorrhage component to this more recently.      He was referred for possible middle meningeal embolization.      Today, I talked to him about this procedure, the risks and benefits of it.  At this point, he is inclined to continue to observe the subdural with repeat head CT and if it gets bigger on future CT, then to proceed with the middle meningeal embolization, but to wait for now.  I think this is very reasonable, and I will order a head CT for 3 weeks from now.  I would like to see him back in clinic at that time and then will further evaluate the potential for a middle meningeal embolization.      Overall, I spent approximately 20 minutes on the phone with Antwan, with the majority of this time spent in consultation and developing a treatment plan.      Again, thank you for allowing me to participate in the care of your patient.  Sincerely,       FLOR HAMMOND MD  D: 10/20/2020   T: 10/20/2020   MT: al      Name:     ODALIS VILLEDA   MRN:      [id number]        Account:      [id number]   :      1970           Service Date: 10/20/2020      Document: [id number]

## 2020-10-21 NOTE — PATIENT INSTRUCTIONS
Follow-up with Dr. Mccoy in 3 weeks with a CT head.     If you have any questions please contact me at [phone], option 3.    Grant Simpson RN, CNRN  Stroke & Endovascular Care Coordinator    Thank you for choosing [facility] for your health care needs.

## 2020-10-23 ENCOUNTER — TELEPHONE (OUTPATIENT)
Dept: NEUROSURGERY | Facility: CLINIC | Age: 50
End: 2020-10-23

## 2020-10-27 ENCOUNTER — TELEPHONE (OUTPATIENT)
Dept: NEUROSURGERY | Facility: CLINIC | Age: 50
End: 2020-10-27

## 2020-10-28 ENCOUNTER — TELEPHONE (OUTPATIENT)
Dept: NEUROSURGERY | Facility: CLINIC | Age: 50
End: 2020-10-28

## 2020-11-10 ENCOUNTER — ANCILLARY PROCEDURE (OUTPATIENT)
Dept: CT IMAGING | Facility: CLINIC | Age: 50
End: 2020-11-10
Attending: NEUROLOGICAL SURGERY
Payer: COMMERCIAL

## 2020-11-10 DIAGNOSIS — S06.5XAA SUBDURAL HEMATOMA (H): ICD-10-CM

## 2020-11-10 PROCEDURE — 70450 CT HEAD/BRAIN W/O DYE: CPT | Mod: GC | Performed by: RADIOLOGY

## 2020-11-17 ENCOUNTER — VIRTUAL VISIT (OUTPATIENT)
Dept: NEUROSURGERY | Facility: CLINIC | Age: 50
End: 2020-11-17
Payer: COMMERCIAL

## 2020-11-17 DIAGNOSIS — S06.5XAA SUBDURAL HEMATOMA (H): Primary | ICD-10-CM

## 2020-11-17 PROCEDURE — 99213 OFFICE O/P EST LOW 20 MIN: CPT | Mod: 95 | Performed by: NEUROLOGICAL SURGERY

## 2020-11-17 NOTE — PATIENT INSTRUCTIONS
Follow up with a Virtual Office visit with Dr Mccoy in 2 months with CT Head without contrast.      Call Leonora CAROLINA for questions/concerns     Thank you for using [facility]

## 2020-11-17 NOTE — LETTER
"2020       RE: Odalis Villeda  [address] [place] [phone]     Dear Colleague,    Thank you for referring your patient, Odalis Villeda, to the [facility] NEUROSURGERY CLINIC [place] at [facility]. Please see a copy of my visit note below.    Service Date: 2020      TELEPHONE VISIT       I had the pleasure to talk to Odalis today over the telephone during a neurosurgical phone visit.        Odalis is a 50-year-old gentleman who had a left-sided subdural hematoma.  This had been seen to expand on a repeat CT scan.  Ultimately, we elected to continue to observe this.      Today he comes back for routine followup.  Neurologically, his symptoms are improving.  His CT demonstrates that the subdural hematoma is resolving.  At this point in time, I would recommend a repeat CT in 2 months.      Overall, I spent approximately 20 minutes on the phone with Odalis with the majority of this time spent in consultation and developing a treatment plan.         FLOR HMAMOND MD             D: 2020   T: 2020   MT: romulo      Name:     ODALIS VILLEDA   MRN:      [id number]        Account:      [id number]   :      1970           Service Date: 2020      Document: [id number]      NA    Odalis Villeda is a 50 year old male who is being evaluated via a billable telephone visit.      The patient has been notified of following:     \"This telephone visit will be conducted via a call between you and your physician/provider. We have found that certain health care needs can be provided without the need for a physical exam.  This service lets us provide the care you need with a short phone conversation.  If a prescription is necessary we can send it directly to your pharmacy.  If lab work is needed we can place an order for that and you can then stop by our lab to have the test done at a later time.    Telephone visits are " "billed at different rates depending on your insurance coverage. During this emergency period, for some insurers they may be billed the same as an in-person visit.  Please reach out to your insurance provider with any questions.    If during the course of the call the physician/provider feels a telephone visit is not appropriate, you will not be charged for this service.\"    Patient has given verbal consent for Telephone visit?  Yes    What phone number would you like to be contacted at?     How would you like to obtain your AVS? Mail a copy          Again, thank you for allowing me to participate in the care of your patient.      Sincerely,    Aramis Mccoy MD      "

## 2020-11-17 NOTE — LETTER
Date:January 21, 2021      Patient was self referred, no letter generated. Do not send.        [facility] Health Information

## 2020-11-18 NOTE — PROGRESS NOTES
Service Date: 2020      TELEPHONE VISIT       I had the pleasure to talk to Odalis today over the telephone during a neurosurgical phone visit.        Odalis is a 50-year-old gentleman who had a left-sided subdural hematoma.  This had been seen to expand on a repeat CT scan.  Ultimately, we elected to continue to observe this.      Today he comes back for routine followup.  Neurologically, his symptoms are improving.  His CT demonstrates that the subdural hematoma is resolving.  At this point in time, I would recommend a repeat CT in 2 months.      Overall, I spent approximately 20 minutes on the phone with Odalis with the majority of this time spent in consultation and developing a treatment plan.         FLOR HAMMOND MD             D: 2020   T: 2020   MT: romulo      Name:     ODALIS VILLEDA   MRN:      [id number]        Account:      [id number]   :      1970           Service Date: 2020      Document: [id number]

## 2020-12-15 NOTE — PROGRESS NOTES
"Ryder Conn is a 50 year old male who is being evaluated via a billable telephone visit.      The patient has been notified of following:     \"This telephone visit will be conducted via a call between you and your physician/provider. We have found that certain health care needs can be provided without the need for a physical exam.  This service lets us provide the care you need with a short phone conversation.  If a prescription is necessary we can send it directly to your pharmacy.  If lab work is needed we can place an order for that and you can then stop by our lab to have the test done at a later time.    Telephone visits are billed at different rates depending on your insurance coverage. During this emergency period, for some insurers they may be billed the same as an in-person visit.  Please reach out to your insurance provider with any questions.    If during the course of the call the physician/provider feels a telephone visit is not appropriate, you will not be charged for this service.\"    Patient has given verbal consent for Telephone visit?  Yes    What phone number would you like to be contacted at?     How would you like to obtain your AVS? Mail a copy      "

## 2021-01-18 ENCOUNTER — ANCILLARY PROCEDURE (OUTPATIENT)
Dept: CT IMAGING | Facility: CLINIC | Age: 51
End: 2021-01-18
Attending: PHYSICIAN ASSISTANT
Payer: COMMERCIAL

## 2021-01-18 ENCOUNTER — TELEPHONE (OUTPATIENT)
Dept: NEUROSURGERY | Facility: CLINIC | Age: 51
End: 2021-01-18

## 2021-01-18 DIAGNOSIS — S06.5XAA SUBDURAL HEMATOMA (H): ICD-10-CM

## 2021-01-18 PROCEDURE — 70450 CT HEAD/BRAIN W/O DYE: CPT | Mod: GC | Performed by: RADIOLOGY

## 2021-01-20 NOTE — TELEPHONE ENCOUNTER
"I spoke with . Senia re: his most recent head CT results. Left convexity sdh has resolved. He has followed up with TBI clinic once and has plans to see them again. Reports ongoing memory issues and \"not feeling himself\" since his fall off a ladder in October; this has not worsened. No new headaches, dizziness, new falls, confusion. I encouraged him to continue with concussion clinic. He can follow up with Neurosurgery as needed, knows to call with new symptoms and we'd be happy to get him in for head CT prn.    Hannah  "

## 2021-02-27 ENCOUNTER — HEALTH MAINTENANCE LETTER (OUTPATIENT)
Age: 51
End: 2021-02-27

## 2021-05-26 ASSESSMENT — PATIENT HEALTH QUESTIONNAIRE - PHQ9: SUM OF ALL RESPONSES TO PHQ QUESTIONS 1-9: 5

## 2021-05-26 NOTE — PATIENT INSTRUCTIONS - HE
Come fasting.    We will check follow up calcium and glucose.    Caroline Diaz MD  3/22/2019

## 2021-05-27 NOTE — PROGRESS NOTES
Assessment:     1. Moderate alcohol use disorder (H)     2. Increased ammonia level     3. Calcium blood increased     4. Increased bilirubin level     5. Abnormal GGT test     6. Elevated glucose       While his liver test and ammonia levels will be followed with gastroenterology.      I did discuss with him that he should also follow on the elevated glucose and elevated calcium.  At this time patient defers it for future.  I think this is reasonable.  Patient remains  asymptomatic.    Plan:      The diagnosis was discussed with the patient and evaluation and treatment plans outlined.  Follow up: Return for Annual physical.   Patient Instructions   Come fasting.    We will check follow up calcium and glucose.    Caroline Diaz MD  3/22/2019            Subjective:      Antwan Conn is a  male who presents for evaluation of   Chief Complaint   Patient presents with     Follow-up     Blood pressure      And is here for follow-up of blood pressure.  His blood pressure is in the normal range.  He denies any shortness of breath, chest palpitations, chest pain or any other symptoms referable to her blood pressures.    Since I have last seen him, he has been followed with psychology and is following the treatment program.  In addition he is also being followed with the [place] [facility] for his elevated liver function test.  Additional workup has been initiated and an ultrasound has been ordered for him by the gastroenterologist.  We do not have the records of this assessment yet, as he has recently been evaluated.    He has since then stopped drinking.    Regarding his [facility] paperwork, it remains in satisfactory as we did not have definitive time.  For his treatments.  This has been further amended by treatment Center.  However, patient reports that his human resources is still not satisfied with the paperwork.    The following portions of the patient's history were reviewed and updated as appropriate:  allergies, current medications, past family history, past medical history, past social history, past surgical history and problem list.  No Known Allergies    Current Outpatient Medications on File Prior to Visit   Medication Sig Dispense Refill     eszopiclone (LUNESTA) 2 MG Tab Take 2 mg by mouth.       folic acid (FOLVITE) 1 MG tablet Take 1 tablet (1 mg total) by mouth daily. 100 tablet 3     multivitamin therapeutic tablet Take 1 tablet by mouth daily.       thiamine 100 MG tablet Take 1 tablet (100 mg total) by mouth daily. 100 tablet 3     traZODone (DESYREL) 50 MG tablet TK 1 TO 2 TS PO AT BEDTIME PRF SLEEP  0     No current facility-administered medications on file prior to visit.        Patient Active Problem List   Diagnosis     Moderate alcohol use disorder (H)     Persistent insomnia     Abnormal GGT test     Increased bilirubin level     Calcium blood increased     Increased ammonia level     Elevated glucose       History reviewed. No pertinent past medical history.    History reviewed. No pertinent surgical history.    Family History   Problem Relation Age of Onset     Hypertension Mother      Dementia Mother 70     Multiple myeloma Father 71     Stroke Maternal Grandfather      Heart disease Paternal Grandmother        Social History     Socioeconomic History     Marital status: Single     Spouse name: None     Number of children: None     Years of education: None     Highest education level: None   Occupational History     None   Social Needs     Financial resource strain: None     Food insecurity:     Worry: None     Inability: None     Transportation needs:     Medical: None     Non-medical: None   Tobacco Use     Smoking status: Never Smoker     Smokeless tobacco: Never Used   Substance and Sexual Activity     Alcohol use: Yes     Frequency: 4 or more times a week     Drinks per session: 10 or more     Binge frequency: Weekly     Comment: More Vodka     Drug use: None     Sexual activity: None    Lifestyle     Physical activity:     Days per week: None     Minutes per session: None     Stress: None   Relationships     Social connections:     Talks on phone: None     Gets together: None     Attends Buddhism service: None     Active member of club or organization: None     Attends meetings of clubs or organizations: None     Relationship status: None     Intimate partner violence:     Fear of current or ex partner: None     Emotionally abused: None     Physically abused: None     Forced sexual activity: None   Other Topics Concern     None   Social History Narrative    Single and no children. Lives by self.     Has siblings in the [place].    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        Caroline Diaz MD  2/15/2019           Review of Systems  A 12 point comprehensive review of systems was negative except as noted.       Objective:   /73 (Patient Site: Left Arm, Patient Position: Sitting, Cuff Size: Adult Regular)   Pulse 88   Temp 97.3  F (36.3  C) (Oral)   Wt 213 lb 12.8 oz (97 kg)   BMI 28.49 kg/m      General Appearance: healthy, alert, oriented and no distress  HEENT Exam: Oropharynx clear without lesion or exudate  Neck:  supple, no adenopathy, thyroid normal  Heart: Normal heart sounds, S1 and S2, No murmurs.  Lungs: Normal breath sounds, Clear to auscultation bilateral  Skin exam: No visible or palpable abnormalities  Neurological exam: gait normal, alert and oriented X 3  Hem/Lymph/Immuno exam: negative findings:  no cervical nodes, no supraclavicular nodes,

## 2021-06-01 NOTE — PATIENT INSTRUCTIONS - HE
Follow up for a repeat BP and pulse in 24-48 hours    Follow up for fasting  Labs    If the labs are unrevealing then we will refer you to orthopedics    If the BP and pulse are still up then consider an antihypertensive

## 2021-06-01 NOTE — PROGRESS NOTES
DIAGNOSIS:  1. Tendonitis, left forearm C-Reactive Protein(CRP)    Rheumatoid Factor Quant    Sedimentation Rate    Uric Acid    CANCELED: Uric Acid    CANCELED: C-Reactive Protein(CRP)    CANCELED: Sedimentation Rate    CANCELED: Rheumatoid Factor Quant   2. Tachycardia  Thyroid Cascade    CANCELED: Thyroid Cascade   3. Hypercalcemia:    11.1 (2-15-19)_ Calcium, Ionized, Measured    Comprehensive Metabolic Panel    CANCELED: Calcium, Ionized, Measured    CANCELED: Comprehensive Metabolic Panel   4. Screening PSA (prostate specific antigen)  PSA (Prostatic-Specific Antigen), Annual Screen   5.      Elevated BP without established dx of HTN    PLAN:     Follow up for a repeat BP and pulse in 24-48 hours    Follow up for fasting  Labs    If the labs are unrevealing then we will refer you to orthopedics    If the BP and pulse are still up then consider an antihypertensive    Note that pt was anxious to leave today and wanted to do so before any lab testing could be done.  Also an EKG was declined.      I spent 40 min with patient face-to-face, of which 50% or greater was spent in counseling and coordination of care in regards to patient's left forearm pain, elevated BP and HR.   Please see plan above           HPI:   Done at 12:12  Onset of wrist pain (pain in the wrist that increased over 1 1/2 days), got swollen and red, and was given a medrol dose pack (at a [facility] 7-19-19)  that helped a ton.  Pain goes up and down with use.  Now seems to be regressing and is getting pain in the left forearm tendons.   Presently the pain is there constantly and aggravated by use.  Right now is very stiff.  Pain  Up to 7/10    Taking 3 ibuprofen 3-4 times a day and tylenol 1-3 a day but  Has tapered use of these some.    The ankles, feet and back of the legs constantly give him pain for which he has seen multiple physicians.    No known h/o HTN.   Non-smoker  No alcohol use since spring 2019    No cold medications in the  "last 24 hours.  Has had four shots of espresso this am    Pt notes he tends to be very nervous in the doctors office but states his BP is normal at home.  .         Current Outpatient Medications on File Prior to Visit   Medication Sig Dispense Refill     multivitamin therapeutic tablet Take 1 tablet by mouth daily.       eszopiclone (LUNESTA) 2 MG Tab Take 2 mg by mouth.       folic acid (FOLVITE) 1 MG tablet Take 1 tablet (1 mg total) by mouth daily. 100 tablet 3     thiamine 100 MG tablet Take 1 tablet (100 mg total) by mouth daily. 100 tablet 3     traZODone (DESYREL) 50 MG tablet TK 1 TO 2 TS PO AT BEDTIME PRF SLEEP  0     No current facility-administered medications on file prior to visit.        Pmh: reviewed  Psh: reviewed  Allergy:  reviewed      EXAM:    BP (!) 158/109   Pulse (!) 126   Ht 6' 0.5\" (1.842 m)   Wt 221 lb 9.6 oz (100.5 kg)   SpO2 94%   BMI 29.64 kg/m     BP Readings from Last 3 Encounters:   09/04/19 (!) 158/109   03/22/19 115/73   03/01/19 (!) 153/115      GEN:   ALERT, NAD, ORIENTED TIMES THREE  NECK: SUPPLE WITHOUT ADENOPATHY OR THYROMEGALY  LUNGS: CTA  COR: RRR WITHOUT MURMUR  SKIN: NO RASH , ULCERS OR LESIONS NOTED  MS: tx over the left distal volar and dorsal forearm. Slight swelling but no redness or warmth.  NEURO: normal affect, speech and mentation  EXT: WITHOUT EDEMA/SWELLING    No results found for this or any previous visit (from the past 168 hour(s)).       Lab Results   Component Value Date    TSH 3.85 02/15/2019     Results for orders placed or performed in visit on 02/15/19   Comprehensive Metabolic Panel   Result Value Ref Range    Sodium 136 136 - 145 mmol/L    Potassium 3.4 (L) 3.5 - 5.0 mmol/L    Chloride 92 (L) 98 - 107 mmol/L    CO2 27 22 - 31 mmol/L    Anion Gap, Calculation 17 5 - 18 mmol/L    Glucose 149 (H) 70 - 125 mg/dL    BUN 15 8 - 22 mg/dL    Creatinine 0.78 0.70 - 1.30 mg/dL    GFR MDRD Af Amer >60 >60 mL/min/1.73m2    GFR MDRD Non Af Amer >60 >60 " mL/min/1.73m2    Bilirubin, Total 3.2 (H) 0.0 - 1.0 mg/dL    Calcium 11.1 (H) 8.5 - 10.5 mg/dL    Protein, Total 8.3 (H) 6.0 - 8.0 g/dL    Albumin 5.2 (H) 3.5 - 5.0 g/dL    Alkaline Phosphatase 70 45 - 120 U/L    AST 36 0 - 40 U/L    ALT 26 0 - 45 U/L

## 2021-06-02 VITALS — WEIGHT: 208.4 LBS | BODY MASS INDEX: 27.62 KG/M2 | HEIGHT: 73 IN

## 2021-06-02 VITALS — BODY MASS INDEX: 27.3 KG/M2 | HEIGHT: 73 IN | WEIGHT: 206 LBS

## 2021-06-02 VITALS — BODY MASS INDEX: 28.49 KG/M2 | WEIGHT: 213.8 LBS

## 2021-06-02 NOTE — TELEPHONE ENCOUNTER
Referral Request  Type of referral: Orhtopedic  Who s requesting: Patient  Why the request: Left forearm/hand and both feet  Have you been seen for this request: Yes  Does patient have a preference on a group/provider? Insurance based.  Okay to leave a detailed message?  Yes

## 2021-06-02 NOTE — TELEPHONE ENCOUNTER
Patient was in this morning for labs and he requested that Dr. JORDAN place an order to check iron levels seeing how they were off back in 2/2019. If this is ok, please place add-on order and I can add to blood drawn this morning. Thanks!

## 2021-06-02 NOTE — TELEPHONE ENCOUNTER
I am not sure if iron levels were  high?  Is he talking about ammonia levels?.  I do see that Dr. Gandhi has ordered some labs for him.  He does follow with [place] [facility].  Caroline Diaz MD  10/4/2019

## 2021-06-02 NOTE — TELEPHONE ENCOUNTER
The patient was positive he wanted iron levels due to his recent fatigue. Those were not being checked by [facility] today.

## 2021-06-02 NOTE — TELEPHONE ENCOUNTER
Referral is done.  Please inform patient I am not sure about the reason, however I have made a referral.  He can come to clinic for evaluation.  Caroline Diaz MD  10/14/2019

## 2021-06-02 NOTE — TELEPHONE ENCOUNTER
----- Message from Elvis Gandhi MD sent at 10/5/2019  1:59 PM CDT -----  Hi Gene:  All of your recent labs were NORMAL except for a trace and insignificant elevation of the Sed Rate.  All the other inflammatory markers were completely NORMAL.    Your PSA (prostate cancer screening blood test) was normal and should be rechecked yearly until age 69.    Your thyroid, kidney and liver functions were all NORMAL.  The blood sugar level and the recheck on your calcium were NORMAL.  The testing for rheumatoid arthritis and gout were negative.  If you are still having forearm pain and would like to see Orthopedics then please let me know and I will place the referral.

## 2021-06-02 NOTE — TELEPHONE ENCOUNTER
I have ordered an iron level.  He should address this with gastroenterology.    He has had a normal iron panel done at [facility] on 03/25/2019    Caroline Diaz MD  10/4/2019

## 2021-06-02 NOTE — TELEPHONE ENCOUNTER
Left message to call back for: Results  Information to relay to patient:  See message below.  Please relay information.

## 2021-06-03 VITALS
WEIGHT: 221.6 LBS | SYSTOLIC BLOOD PRESSURE: 158 MMHG | OXYGEN SATURATION: 94 % | HEART RATE: 126 BPM | BODY MASS INDEX: 29.37 KG/M2 | DIASTOLIC BLOOD PRESSURE: 109 MMHG | HEIGHT: 73 IN

## 2021-06-16 NOTE — TELEPHONE ENCOUNTER
Telephone Encounter by Catalina Pardo LPN at 10/18/2019  3:32 PM     Author: Catalina Pardo LPN Service: -- Author Type: Licensed Nurse    Filed: 10/18/2019  3:34 PM Encounter Date: 10/14/2019 Status: Signed    : Catalina Pardo LPN (Licensed Nurse)       Patient Returning Call  Reason for call:  Message from clinic  Information relayed to patient: Caroline Diaz MD   to Roseann Chaves          10/14/19 8:42 PM   Referral is done.  Please inform patient I am not sure about the reason, however I have made a referral.  He can come to clinic for evaluation.     Patient has additional questions:  No  If YES, what are your questions/concerns:  n/a  Okay to leave a detailed message?: No call back needed

## 2021-06-17 NOTE — PATIENT INSTRUCTIONS - HE
Patient Instructions by Caroline Diaz MD at 2/15/2019  9:10 AM     Author: Caroline Diaz MD Service: -- Author Type: Physician    Filed: 2/15/2019 10:17 AM Encounter Date: 2/15/2019 Status: Signed    : Caroline Diaz MD (Physician)       I have seen you today for chemical dependency evaluation.    Based on clinical signs and symptoms, it seems you are at risk of withdrawal.  As discussed with you this is noting extremely high blood pressure and tremors.    You have declined to go to inpatient right now.  Again, I emphasize that you be seen for inpatient evaluation at [facility]    I have referred you addction clinic at [facility].  However, you may not be able to see them right away.    Here is [facility] connection number-[phone].  Please call if you have any worrisome signs.    Patient Education     Alcohol Withdrawal  Alcohol withdrawal usually begins after prolonged heavy drinking, and then you suddenly stop drinking, or cut down on your alcohol use. It is not one thing, but is a complex combination of signs and symptoms that generally occur to together and define a particular problem or condition.    Alcohol withdrawal is potentially life-threatening, and is a medical emergency.    It can start as early as a couple of hours after your last drink, or may take 1 to 3 days to develop.    It can last from days to a week or more.    It can worsen very quickly.  Signs and symptoms  There are several stages of alcohol withdrawal, although they overlap, as do their signs and symptoms. In the earlier stages, it most commonly includes:    Anxiety    Shakiness    Nausea and vomiting    Sweating    Insomnia    Headaches    Fever    Mood swings, irritability, agitation, restlessness  Delirium tremens (DTs)  DTs are a severe and life-threatening complication. If it happens, it usually begins about 3 to 5 days after your last drink. It is potentially life threatening. DTs are characterized  by:    Sudden and severe mental or nervous system changes    Uncontrollable tremors    Severe disorientation, confusion, hallucinations    Heart racing, or irregular heartbeat    High blood pressure    Seizures    Possible coma and death  Home care    You will need plenty of rest and fluids over the next several days. Eat regular meals and drink plenty of fluids to prevent dehydration. Do not drink any more alcohol. During this time, it is best that you stay with family or friends who can help and support you. You can also admit yourself to a residential detox program.    Do not drive until all symptoms are gone and you are feeling better. if you've had a seizure, don't drive until you have been examined by a doctor.    If you were given sedative medication to reduce your symptoms, do not take it more often than prescribed and never take it with alcohol.  Follow-up care  Once you have gone through the withdrawal symptoms, you have fought half of the nichols. To avoid the risk of returning to your previous drinking pattern, it is essential that you get follow-up support and treatment.    Alcoholics Anonymous offers support through a self-help fellowship. There are no dues or fees. See the Yellow Pages and call for meeting times and places. www.aa.org    [employer] offers support to families of alcohol users. 178.213.7072 www.al-anon.org    [employer] [place] On Alcoholism And Drug Dependence 311-010-3610 www.ncadd.org    Residential alcohol detox programs are available. Check the Yellow Pages under Drug Abuse & Treatment Centers.  Call 911  Call 911 if any of these occur:    Seizure    Trouble breathing or slow, irregular breathing    Chest pain    Sudden weakness on one side of the body or sudden trouble speaking    Heavy bleeding or vomiting blood    Very drowsy or trouble awakening    Fainting or loss of consciousness    Rapid heart rate  When to seek medical advice  Call your healthcare provider right away if any of  these occur:    Severe shakiness    Hallucinations    Fever over 100.4  F (38.0  C)    Headache, confusion, extreme drowsiness, inability to awaken    Increasing upper abdominal pain    Repeated vomiting  Date Last Reviewed: 1/11/2016 2000-2017 [employer]. [address] [place] [id number]. All rights reserved. This information is not intended as a substitute for professional medical care. Always follow your healthcare professional's instructions.

## 2021-06-18 NOTE — LETTER
Letter by Caroline Diaz MD at      Author: Caroline Diaz MD Service: -- Author Type: --    Filed:  Encounter Date: 2/18/2019 Status: (Other)       Antwan Conn  [address] [place] [id number]             February 18, 2019         Dear Mr. Conn,    Below are the results from your recent visit:    Resulted Orders   Comprehensive Metabolic Panel   Result Value Ref Range    Sodium 136 136 - 145 mmol/L    Potassium 3.4 (L) 3.5 - 5.0 mmol/L    Chloride 92 (L) 98 - 107 mmol/L    CO2 27 22 - 31 mmol/L    Anion Gap, Calculation 17 5 - 18 mmol/L    Glucose 149 (H) 70 - 125 mg/dL    BUN 15 8 - 22 mg/dL    Creatinine 0.78 0.70 - 1.30 mg/dL    GFR MDRD Af Amer >60 >60 mL/min/1.73m2    GFR MDRD Non Af Amer >60 >60 mL/min/1.73m2    Bilirubin, Total 3.2 (H) 0.0 - 1.0 mg/dL    Calcium 11.1 (H) 8.5 - 10.5 mg/dL    Protein, Total 8.3 (H) 6.0 - 8.0 g/dL    Albumin 5.2 (H) 3.5 - 5.0 g/dL    Alkaline Phosphatase 70 45 - 120 U/L    AST 36 0 - 40 U/L    ALT 26 0 - 45 U/L    Narrative    Fasting Glucose reference range is 70-99 mg/dL per  American Diabetes Association (ADA) guidelines.   INR   Result Value Ref Range    INR 0.99 0.90 - 1.10    Narrative    INR Therapeutic Ranges:  Mech. Valve 2.5-3.5  Post Surg.  2.0-3.0  DVT/PE      2.0-3.0   GGT (Gamma GT)   Result Value Ref Range    GGT (Gamma GT) 135 (H) 0 - 50 U/L   Ammonia   Result Value Ref Range    Ammonia 46 (H) 11 - 35 umol/L   Thyroid Cascade   Result Value Ref Range    TSH 3.85 0.30 - 5.00 uIU/mL   HM1 (CBC with Diff)   Result Value Ref Range    WBC 8.2 4.0 - 11.0 thou/uL    RBC 5.15 4.40 - 6.20 mill/uL    Hemoglobin 16.1 14.0 - 18.0 g/dL    Hematocrit 47.9 40.0 - 54.0 %    MCV 93 80 - 100 fL    MCH 31.3 27.0 - 34.0 pg    MCHC 33.7 32.0 - 36.0 g/dL    RDW 12.7 11.0 - 14.5 %    Platelets 193 140 - 440 thou/uL    MPV 7.2 7.0 - 10.0 fL    Neutrophils % 74 (H) 50 - 70 %    Lymphocytes % 13 (L) 20 - 40 %    Monocytes % 11 (H) 2 - 10 %    Eosinophils % 1 0 - 6 %     Basophils % 1 0 - 2 %    Neutrophils Absolute 6.1 2.0 - 7.7 thou/uL    Lymphocytes Absolute 1.1 0.8 - 4.4 thou/uL    Monocytes Absolute 0.9 0.0 - 0.9 thou/uL    Eosinophils Absolute 0.1 0.0 - 0.4 thou/uL    Basophils Absolute 0.0 0.0 - 0.2 thou/uL   Drug Abuse 1+, Urine   Result Value Ref Range    Amphetamines Screen Negative Screen Negative    Benzodiazepines Screen Negative Screen Negative    Opiates Screen Negative Screen Negative    Phencyclidine Screen Negative Screen Negative    THC Screen Negative Screen Negative    Barbiturates Screen Negative Screen Negative    Cocaine Metabolite Screen Negative Screen Negative    Methadone Screen Negative Screen Negative    Oxycodone Screen Negative Screen Negative    Creatinine, Urine 242.0 mg/dL    Narrative    Drug                           Screening Threshold    Amphetamines                    1000 ng/mL  Benzodiazepine                   200 ng/mL  Opiates                          300 ng/mL  Phencyclidine                     25 ng/mL  THC Metabolite                    50 ng/mL  Barbiturates                     200 ng/mL  Cocaine Metabolite               150 ng/mL  Methadone                        300 ng/mL  Oxycodone                        100 ng/mL    Screening results are to be used only for medical purposes.  Unconfirmed screening results are not to be used for non-  medical purposes.   Alcohol, Ethyl, Urine Screen   Result Value Ref Range    Alcohol, Urine <10 None detected mg/dL      Comment:      None Detected       Your liver enzymes GGT and ammonia were high, bilirubin was also fairly high and likely consistent with alcohol liver disease.  I am referring you to gastroenterology for further workup.     Please call with questions or contact us using [employer].    Sincerely,        Electronically signed by Caroline Diaz MD

## 2021-06-19 NOTE — LETTER
Letter by Elvis Gandhi MD at      Author: Elvis Gandhi MD Service: -- Author Type: --    Filed:  Encounter Date: 10/7/2019 Status: Signed         Antwan [facility] [address]             October 7, 2019         Dear Mr. Conn,    Below are the results from your recent visit:    Resulted Orders   Calcium, Ionized, Measured   Result Value Ref Range    Calcium, Ionized Measured 1.28 1.11 - 1.30 mmol/L    Calcium, Ionized pH 7.4 1.22 1.11 - 1.30 mmol/L    pH 7.29 (L) 7.35 - 7.45   Comprehensive Metabolic Panel   Result Value Ref Range    Sodium 144 136 - 145 mmol/L    Potassium 4.1 3.5 - 5.0 mmol/L    Chloride 107 98 - 107 mmol/L    CO2 28 22 - 31 mmol/L    Anion Gap, Calculation 9 5 - 18 mmol/L    Glucose 90 70 - 125 mg/dL    BUN 13 8 - 22 mg/dL    Creatinine 0.75 0.70 - 1.30 mg/dL    GFR MDRD Af Amer >60 >60 mL/min/1.73m2    GFR MDRD Non Af Amer >60 >60 mL/min/1.73m2    Bilirubin, Total 0.5 0.0 - 1.0 mg/dL    Calcium 10.3 8.5 - 10.5 mg/dL    Protein, Total 7.3 6.0 - 8.0 g/dL    Albumin 4.5 3.5 - 5.0 g/dL    Alkaline Phosphatase 66 45 - 120 U/L    AST 15 0 - 40 U/L    ALT 18 0 - 45 U/L    Narrative    Fasting Glucose reference range is 70-99 mg/dL per  American Diabetes Association (ADA) guidelines.   C-Reactive Protein(CRP)   Result Value Ref Range    CRP 0.5 0.0 - 0.8 mg/dL   Rheumatoid Factor Quant   Result Value Ref Range    Rheumatoid Factor Quantitative <15.0 0 - 30 IU/mL   Sedimentation Rate   Result Value Ref Range    Sed Rate 18 (H) 0 - 15 mm/hr   Thyroid Cascade   Result Value Ref Range    TSH 2.31 0.30 - 5.00 uIU/mL   Uric Acid   Result Value Ref Range    Uric Acid 6.5 3.0 - 8.0 mg/dL   PSA (Prostatic-Specific Antigen), Annual Screen   Result Value Ref Range    PSA 0.4 0.0 - 3.5 ng/mL    Narrative    Method is Abbott Prostate-Specific Antigen (PSA)  Standard-WHO 1st International (90:10)       Hi Gene:   All of your recent labs were NORMAL except for a trace  and insignificant elevation of the Sed Rate.   All the other inflammatory markers were completely NORMAL.     Your PSA (prostate cancer screening blood test) was normal and should be rechecked yearly until age 70.     Your thyroid, kidney and liver functions were all NORMAL.   The blood sugar level and the recheck on your calcium were NORMAL.   The testing for rheumatoid arthritis and gout were negative.   If you are still having forearm pain and would like to see Orthopedics then please let me know and I will place the referral.     Please call with questions or contact us using [employer].    Sincerely,        Electronically signed by Elvis Gandhi MD

## 2021-06-19 NOTE — LETTER
Letter by Caroline Diaz MD at      Author: Caroline Diaz MD Service: -- Author Type: --    Filed:  Encounter Date: 10/7/2019 Status: Signed         Gene SUKHDEV Conn  [address] [place] [id number]             October 7, 2019         Dear Mr. Conn,    Below are the results from your recent visit:    Resulted Orders   Iron   Result Value Ref Range    Iron 84 42 - 175 ug/dL       Gene, your iron levels are normal.     Please call with questions or contact us using [facility].    Sincerely,        Electronically signed by Caroline Diaz MD

## 2021-06-19 NOTE — LETTER
Letter by Elvis Gandhi MD at      Author: Elvis Gandhi MD Service: -- Author Type: --    Filed:  Encounter Date: 10/5/2019 Status: Signed         [facility] [id number]             October 5, 2019         Dear Mr. Conn,    Below are the results from your recent visit:    Resulted Orders   Calcium, Ionized, Measured   Result Value Ref Range    Calcium, Ionized Measured 1.28 1.11 - 1.30 mmol/L    Calcium, Ionized pH 7.4 1.22 1.11 - 1.30 mmol/L    pH 7.29 (L) 7.35 - 7.45   Comprehensive Metabolic Panel   Result Value Ref Range    Sodium 144 136 - 145 mmol/L    Potassium 4.1 3.5 - 5.0 mmol/L    Chloride 107 98 - 107 mmol/L    CO2 28 22 - 31 mmol/L    Anion Gap, Calculation 9 5 - 18 mmol/L    Glucose 90 70 - 125 mg/dL    BUN 13 8 - 22 mg/dL    Creatinine 0.75 0.70 - 1.30 mg/dL    GFR MDRD Af Amer >60 >60 mL/min/1.73m2    GFR MDRD Non Af Amer >60 >60 mL/min/1.73m2    Bilirubin, Total 0.5 0.0 - 1.0 mg/dL    Calcium 10.3 8.5 - 10.5 mg/dL    Protein, Total 7.3 6.0 - 8.0 g/dL    Albumin 4.5 3.5 - 5.0 g/dL    Alkaline Phosphatase 66 45 - 120 U/L    AST 15 0 - 40 U/L    ALT 18 0 - 45 U/L    Narrative    Fasting Glucose reference range is 70-99 mg/dL per  American Diabetes Association (ADA) guidelines.   C-Reactive Protein(CRP)   Result Value Ref Range    CRP 0.5 0.0 - 0.8 mg/dL   Rheumatoid Factor Quant   Result Value Ref Range    Rheumatoid Factor Quantitative <15.0 0 - 30 IU/mL   Sedimentation Rate   Result Value Ref Range    Sed Rate 18 (H) 0 - 15 mm/hr   Thyroid Cascade   Result Value Ref Range    TSH 2.31 0.30 - 5.00 uIU/mL   Uric Acid   Result Value Ref Range    Uric Acid 6.5 3.0 - 8.0 mg/dL   PSA (Prostatic-Specific Antigen), Annual Screen   Result Value Ref Range    PSA 0.4 0.0 - 3.5 ng/mL    Narrative    Method is Abbott Prostate-Specific Antigen (PSA)  Standard-WHO 1st International (90:10)       Hi Gene:  All of your recent labs were NORMAL except for a trace  and insignificant elevation of the Sed Rate.  All the other inflammatory markers were completely NORMAL.    Your PSA (prostate cancer screening blood test) was normal and should be rechecked yearly until age 70.    Your thyroid, kidney and liver functions were all NORMAL.  The blood sugar level and the recheck on your calcium were NORMAL.  The testing for rheumatoid arthritis and gout were negative.  If you are still having forearm pain and would like to see Orthopedics then please let me know and I will place the referral.    Please call with questions or contact us using [employer].    Sincerely,        Electronically signed by Elvis Gandhi MD

## 2021-06-24 NOTE — TELEPHONE ENCOUNTER
Patient Returning Call  Reason for call:  Return call  Information relayed to patient:  Patient was informed of Caroline Diaz MD's message below in regards to following up in 2 weeks for a blood pressure check. Patient stated he will call back to schedule this.  Patient has additional questions:  No  If YES, what are your questions/concerns:  n/a  Okay to leave a detailed message?: No call back needed

## 2021-06-24 NOTE — PROGRESS NOTES
Assessment:     1. Moderate alcohol use disorder (H)     2. Persistent insomnia       Insomnia appears to be likely secondary to alcohol use.  We will await further evaluation, before discussing medication.  Patient will not be a good candidate for medications that can cause any further dependence.  FMLA forms filled today to the best of my ability based on current information available.  Definitely, patient needs time off from work to start his therapy.        Plan:      The diagnosis was discussed with the patient and evaluation and treatment plans outlined.     Subjective:      Antwan Conn is a  male who presents for evaluation of   Chief Complaint   Patient presents with     Fmla Paperwork     patient will be going into an outpatient program      Patient is here today to complete paperwork for FMLA.  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  He will start outpatient alcohol rehab program.  For this he does need his paperwork completed.  Of note, the program guidelines are unknown.  It is not known how frequently or how many times he may have to go.  He was told that it might approximately be 4 times a week.    He has had evaluation at [facility].  Please see note in epic.    He informs me that since I last seen her, he has not been drinking alcohol.  He generally feels well.    We discussed his elevated GGT and ammonia at last visit.  He is already scheduled to see gastroenterology.    He does have chronic insomnia and asks me if I can refill his Lunesta.  We reviewed Lunesta use and liver failure and I do not think this would be reasonable at this time.  Patient is agreeable.  In the past he has been prescribed trazodone that makes him groggy.    He feels well and denies any symptoms today and feels stable.  The following portions of the patient's history were reviewed and updated as appropriate: allergies, current medications, past family history, past medical  history, past social history, past surgical history and problem list   No Known Allergies    Current Outpatient Medications on File Prior to Visit   Medication Sig Dispense Refill     eszopiclone (LUNESTA) 2 MG Tab Take 2 mg by mouth.       multivitamin therapeutic tablet Take 1 tablet by mouth daily.       traZODone (DESYREL) 50 MG tablet TK 1 TO 2 TS PO AT BEDTIME PRF SLEEP  0     folic acid (FOLVITE) 1 MG tablet Take 1 tablet (1 mg total) by mouth daily. 100 tablet 3     thiamine 100 MG tablet Take 1 tablet (100 mg total) by mouth daily. 100 tablet 3     No current facility-administered medications on file prior to visit.        Patient Active Problem List   Diagnosis     Moderate alcohol use disorder (H)     Persistent insomnia       No past medical history on file.    History reviewed. No pertinent surgical history.    Family History   Problem Relation Age of Onset     Hypertension Mother      Dementia Mother 70     Multiple myeloma Father 71     Stroke Maternal Grandfather      Heart disease Paternal Grandmother        Social History     Socioeconomic History     Marital status: Single     Spouse name: None     Number of children: None     Years of education: None     Highest education level: None   Occupational History     None   Social Needs     Financial resource strain: None     Food insecurity:     Worry: None     Inability: None     Transportation needs:     Medical: None     Non-medical: None   Tobacco Use     Smoking status: Never Smoker     Smokeless tobacco: Never Used   Substance and Sexual Activity     Alcohol use: Yes     Frequency: 4 or more times a week     Drinks per session: 10 or more     Binge frequency: Weekly     Comment: More Vodka     Drug use: None     Sexual activity: None   Lifestyle     Physical activity:     Days per week: None     Minutes per session: None     Stress: None   Relationships     Social connections:     Talks on phone: None     Gets together: None     Attends [employer]  "service: None     Active member of club or organization: None     Attends meetings of clubs or organizations: None     Relationship status: None     Intimate partner violence:     Fear of current or ex partner: None     Emotionally abused: None     Physically abused: None     Forced sexual activity: None   Other Topics Concern     None   Social History Narrative    Single and no children. Lives by self.     Has siblings in the [place].    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        Caroline Diaz MD  2/15/2019         Review of Systems  Constitutional: negative  Respiratory: negative  Cardiovascular: negative  Gastrointestinal: negative       Objective:   BP (!) 153/115 (Patient Site: Left Arm, Patient Position: Sitting, Cuff Size: Adult Regular)   Pulse (!) 104   Temp (!) 96.4  F (35.8  C) (Oral)   Ht 6' 0.64\" (1.845 m)   Wt 208 lb 6.4 oz (94.5 kg)   BMI 27.77 kg/m      General Appearance: healthy, alert, oriented and no distress  Good eye contact, normal speech and content, no flight of ideas. Animated during conversation.        "

## 2021-06-24 NOTE — TELEPHONE ENCOUNTER
----- Message from Caroline Diaz MD sent at 3/1/2019  7:38 PM CST -----  Please inform patient that I would like to see him back for monitoring his blood pressure and his pulse rate.    This can be done in 2 weeks time  I had forgotten to emphasize on this and had asked him to come back for a physical.  However, I do feel that this needs to be monitored.

## 2021-06-24 NOTE — TELEPHONE ENCOUNTER
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  I am referring him to gastroenterology for further workup.  Caroline Diaz MD  2/18/2019

## 2021-06-24 NOTE — TELEPHONE ENCOUNTER
Reason contacted:  Results  Information relayed:  Yes  Additional questions:  No  Further follow-up needed:  No  Okay to leave a detailed message:  No

## 2021-06-24 NOTE — PROGRESS NOTES
"Rule 25 Assessment  Background Information   1. Date of Assessment Request  2. Date of Assessment  2/20/2019 3. Date Service Authorized     4.   PAULO Bowling 5.  Phone Number [phone] 6. Referent  Self 7. Assessment Site  [facility]     8. Client Name Antwan Conn 9. Date of Birth  1970 Age  48 y.o. 10. Gender  male  11. PMI/ Insurance No.   12. Client's Primary Language:  English 13. Do you require special accommodations, such as an  or assistance with written material? No   14. Current Address: [address] [place] [address]   15. Client Phone Numbers: [phone] (home)    16.  Alternate (cell) Phone Number:       17. Tell me what has happened to bring you here today.       Patient reports that he is here because he is looking to stay sober.   He states that he is looking for more than AA or a support group.   Patient reports that he is not a big \"people person.\"  He is looking for practical advise that he is able to relate to, which he did not get from the few AA meetings he tried.   Patient reports that he wants a systematic approach to sobriety.     18. Have you had other rule 25 assessments? no    DIMENSION I - Acute Intoxication /Withdrawal Potential   1. Chemical use most recent 12 months outside a facility and other significant use history (client self-report)             X = Primary Drug Used   Age of First Use Most Recent Pattern of Use and Duration   Need enough information to show pattern (both frequency and amounts) and to show tolerance for each chemical that has a diagnosis   Date of last use and time, if needed   Withdrawal Potential? Requiring special care Method of use  (oral, smoked, snort, IV, etc)   [] Alcohol 15 1 pint per sitting up to 750 mL in a day. 3-4 times per week.  2/13/19 None Oral   [] Marijuana/Hashish  Denies      [] Cocaine/Crack  Denies      [] Meth/Amphetamines  Denies      [] Heroin  Denies      [] Other " Opiates/Synthetics  Denies      [] Inhalants  Denies      [] Benzodiazepines  Denies      [] Hallucinogens  Denies      [] Barbiturates/Sedatives/Hypnotics  Denies      [] Over-the-Counter Drugs  Denies      [] Other  Denies      [] Nicotine  Denies        2. Do you use greater amounts of alcohol/other drugs to feel intoxicated or achieve the desired effect? no.  Or use the same amount and get less of an effect? No (DSM)  Example: Patient denies tolerance at this time. He reports that he has maintained the same amount with the same effect for the past 5 years. Patient reports that he is drinking to go to sleep.       3A. Have you ever been to detox? no    3B. When was the first time? NA    3C. How many times since then? NA    3D. Date of most recent detox: NA      4.  Withdrawal symptoms: Have you had any of the following withdrawal symptoms?  yes  Past 12 months Recent (past 30 days)   Sweating (rapid pulse), Nausea/vomiting, Shakey/jittery/tremors, Diminished appetite, High blood pressure Sweating (rapid pulse), Nausea/vomiting, Shakey/jittery/tremors, Diminished appetite, High blood pressure     Notes:  Patient reports that he last experienced withdrawal symptoms last Thursday. Patient denies any current symptoms or concerns.      's Visual Observations and Symptoms: Patient is oriented x4, and shows no physical signs or symptoms of intoxication or withdrawal.   Based on the above information, is withdrawal likely to require attention as part of treatment participation?  no    Dimension I Ratings   Acute intoxication/Withdrawal potential - The placing authority must use the criteria in Dimension I to determine a client s acute intoxication and withdrawal potential.    RISK DESCRIPTIONS - Severity ratin  Client displays full functioning with good ability to tolerate and cope with withdrawal discomfort.  No signs or symptoms of intoxication or withdrawal or resolving signs or symptoms    REASONS  SEVERITY WAS ASSIGNED (What about the amount of the person s use and date of most recent use and history of withdrawal problems suggests the potential of withdrawal symptoms requiring professional assistance? )    Patient reports a history of frequent alcohol use, with his last use being on 2/13/19. Patient reports a history of withdrawal symptoms. Patient denies any current withdrawal symptoms or concerns. He shows no physical signs or symptoms of intoxication or withdrawal at this time. He does not appear to be at risk of severe withdrawal at this time. Patient is oriented x4.        DIMENSION II - Biomedical Complications and Conditions   1. Do you have any current health/medical conditions?(Include any infectious diseases, allergies, or chronic or acute pain, history of chronic conditions)    Patient reports possible liver issues.   Patient denies any other current issues or concerns.     2. Do you have a health care provider? When was your most recent appointment? What concerns were identified?    Patient reports that he does not currently have a PCP, and that he picks a new doctor every time he needs to go in based on who is closest to him and has an opening quickly.   Patient last saw a doctor the Friday before this appointment.   They were concerned about his drinking in general and about his liver as labs came back with elevated numbers.     3. If indicated by answers to items 1 or 2: How do you deal with these concerns? Is that working for you? If you are not receiving care for this problem, why not?    None; patient reports a need to follow-up based on recent labs.       4A. List current medication(s) including over-the-counter or herbal supplements--including pain management:    Lunesta for insomnia as needed  Proton pump inhibitor for heart burn  Multivitamin    He reports that he is supposed to stay away from ibuprofen and go to tylenol due to health concerns.     4B. Do you follow current medical  "recommendations/take medications as prescribed?   Yes- no abuse    4C. When did you last take your medication?   PM    5. Has a health care provider/healer ever recommended that you reduce or quit alcohol/drug use?  Yes- on friday    6. Are you pregnant?  NA      6B.  Receiving prenatal care?        6C.  When is your baby due?      7. Have you had any injuries, assaults/violence towards you, accidents, health related issues, overdose(s) or hospitalizations related to your use of alcohol or other drugs:  no    8. Do you have any specific physical needs/accommodations? no    Dimension II Ratings   Biomedical Conditions and Complications - The placing authority must use the criteria in Dimension II to determine a client s biomedical conditions and complications.   RISK DESCRIPTIONS - Severity ratin  Client tolerates and maya with physical discomfort and is able to get hte services that the client needs.    REASONS SEVERITY WAS ASSIGNED (What physical/medical problems does this person have that would inhibit his or her ability to participate in treatment? What issues does he or she have that require assistance to address?)    Patient reports possible concerns about his liver due to elevated labs at his most recent doctor appointment. Patient denies all other medical concerns or conditions at this time. Patient does report a need to follow-up with his labs to determine what is wrong. Patient states that he does not have a PCP, but merely goes to whatever doctor is closest and has an opening. He does appear to get care as needed at this time. Patient appears able to function adequately at this time.              DIMENSION III - Emotional, Behavioral, Cognitive Conditions and Complications   1. (Optional) Tell me what it was like growing up in your family. (substance use, mental health, discipline, abuse, support)    CHILDHOOD/FAMILY LIFE- Patient repots that his childhood was \"orderly.\" He states that there " "wasn't abuse, but it was not a warm family setting. It was a family where you lived up to the obligations set upon you.   PARENTS- Patient's parents were together while growing up. Their relationship was not warm, but it was respectful. Parents are both  now.   SIBLINGS- Patient reports older and younger siblings. He states that there has been tension between he and his siblings since their mother passed away a few years ago.     Substance Use;  None known.     Mental Health;   None known.     Abuse;  Patient denies any history of any type of abuse throughout his life.       2. When was the last time that you had significant problems   A. With feeling very trapped, lonely, sad, blue, depressed or hopeless about the future?   Past month- Patient reports that he feels lonely all of the time as he lives alone.      B. With sleep trouble, such as bad dreams, sleeping restlessly, or falling asleep during the day?   Past month- Patient endorsed insomnia and getting tired during the day due to the lack of sleep.       C. With feeling very anxious, nervous, tense, scared, panicked, or like something bad was going to happen?   Past month- Patient reports being anxious about this appointment, but denies ongoing issues or concerns.      D. With becoming very distressed and upset when something reminded you of the past?   Never- Denies       E. With thinking about ending your life or committing suicide?    Never- Denies      3.  When was the last time that you did the following things two or more times?  A. Lied or conned to get things you wanted or to avoid having to do something?   2-12 months ago- Believes it has happened, but was unsure of what exactly.      B. Had a hard time paying attention at school, work, or home?   Never- Denies       C. Had a hard time listening to instructions at school, work, or home?   Past month- \"Always.\" Reported general problems with this.      D. Were a bully or threatened other " people?   Never- Denies       E. Started physical fights with other people?   1+ years ago- In high school.      Note: These questions are from the Global Appraisal of Individual Needs--Short Screener. Any item marked  past month  or  2 to 12 months ago  will be scored with a severity rating of at least 2.  For each item that has occurred in the past month or past year ask follow up questions to determine how often the person has felt this way or has the behavior occurred? How recently? How has it affected their daily living? And, whether they were using or in withdrawal at the time?    See Above.     4A. If the person has answered item 2E with  in the past year  or  the past month , ask about frequency and history of suicide in the family or someone close and whether they were under the influence.    Any history of suicide in your family? Or someone close to you?  no      4B. If the person answered item 2E  in the past month  ask about  intent, plan, means and access and any other follow-up information  to determine imminent risk. Document any actions taken to intervene  on any identified imminent risk.     Patient denies any suicidal thoughts or plans.   Patient denies any history of SIB.     5A. Have you ever been diagnosed with a mental health problem?  no-Denies  5B. Are you receiving care for any mental health issues? no  If yes, what is the focus of that care or treatment?  Are you satisfied with the service?  Most recent appointment?  How has it been helpful?    Patient denies current services, but reports that he does have access to services through the [facility] if needed.     6A.  Have you been prescribed medications for emotional/psychological problems?  no  6B.  Current mental health medication(s) If these medications are listed for Dimension II, reference item II-5.  6C.  Are you taking your medications as instructed?  NA    7A. Does your MH provider know about your use?  NA- no current services  7B.   What does he or she have to say about it? (DSM)  NA    8A. Have you ever been verbally, emotionally, physically or sexually abused? no   Follow up questions to learn current risk, continuing emotional impact.  Denies  8B. Have you received counseling for abuse?  NA    9A. Have you ever experienced or been part of a group that experienced community violence, historical trauma, rape or assault? no  9B.  How has that affected you?  Denies  9C.  Have you received counseling for that?  NA    10A. [place]: no- but reports that he was a   10B.  Exposure to Combat?  no    11. Do you have problems with any of the following things in your daily life?  None      Note: If the person has any of the above problems, how do they deal with them, have they developed coping mechanisms?  Have they received treatment?  Follow up with items 12, 13, and 14. If none of the issues in item 11 are a problem for the person, skip to item 15.    Patient denies any issues in the areas listed above.       12. Have you been diagnosed with traumatic brain injury or Alzheimer s?  no-Denies    13.  If the answer to #12 is no, ask the following questions:    Have you ever hit your head or been hit on the head? yes- Reports that it has happened as a child, denies any as an adult.     Were you ever seen in the Emergency Room, hospital, or by a doctor because of an injury to your head? no-Denies    Have you had any significant illness that affected your brain (brain tumor, meningitis, West Nile Virus, stroke or seizure, heart attack, near drowning or near suffocation)?  no-Denies    14.  If the answer to # 12 is yes, ask if any of the problems identified in #11 occurred since the head injury or loss of oxygen no    15A. Highest grade of school completed:  Graduate school for PhD, but didn't finish  15B. Do you have a learning disability? no  15C. Did you ever have tutoring in Math or English? no.  15D. Have you ever been diagnosed with Fetal  Alcohol Effects or Fetal Alcohol Syndrome? No- believes that his mom may have drank     Explain:      16. If yes to item 15 B, C, or D: How has this affected your use or been affected by your use?     Patient reports that he stopped going to graduate school as he wasn't into it anymore as he was going for philosophy.     Dimension III Ratings   Emotional/Behavioral/Cognitive - The placing authority must use the criteria in Dimension III to determine a client s emotional, behavioral, and cognitive conditions and complications.   RISK DESCRIPTIONS - Severity ratin  Client has impulse control and coping skills.  Client presents a mild to moderate risk of harm to self or others or displays symptoms of emotional, behavioral or cognitive problems.  Client has a mental health diagnosis and is stable.  Client functions adequately in significant life areas.    REASONS SEVERITY WAS ASSIGNED - What current issues might with thinking, feelings or behavior pose barriers to participation in a treatment program? What coping skills or other assets does the person have to offset those issues? Are these problems that can be initially accommodated by a treatment provider? If not, what specialized skills or attributes must a provider have?    Patient denies any current mental health diagnoses. He did however endorse some possible symptoms of depression and anxiety, but has no formal diagnosis. Patient reports an ongoing flat affect, which was apparent throughout this appointment. Patient appears to struggle with impulse control as evidence by his continued alcohol use despite issues and concerns with missing work. Patient denies any suicidal thoughts or plans. Patient is oriented x4.            DIMENSION IV - Readiness for Change   1. You ve told me what brought you here today. (first section) What do you think the problem really is?     Patient reports that the problem is staying/maintaining sobriety.   He states that he feels  "that he can't find anything else in life to replace alcohol with.     2. Tell me how things are going. Ask enough questions to determine whether the person has use related problems or assets that can be built upon in the following areas: Family/friends/relationships; Legal; Financial; Emotional; Educational; Recreational/ leisure; Vocational/employment; Living arrangements (DSM)     Overall- \"Pretty crappy at work.\" He reports that things are slow, and they are downsizing. Due to the downsizing work is not currently a supportive environment, and has turned into a \"dog eat god\" situation. Patient reports that work is all he does in his life.   Relationships- Patient indicates no relationships at this time. He reports that he had a major falling out with his family after the death of his mother.   Legal- Patient denies.   Financial- \"Well.\"   Emotional- \"Rather flat, which is normal for me.\"   Education- Patient reports that he has been ramping up to go back and finish his master's. He has been completing things online to prepare him to go back, and hopes to go back within the next few months.   Recreation/Leisure- Patient states that he likes to watch movies. He would also like to start doing wood working, and exercising. Patient then stated that nothing really intrigues him or makes him want to continue doing it long-term. Patient reported that he also reads a lot currently.   Employment- Patient works as a  at the [employer]. 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   Living- Patient indicates a stable living environment. He lives in a condo that he owns.     3. What activities have you engaged in when using alcohol/other drugs that could be hazardous to you or others (i.e. driving a car/motorcycle/boat, operating machinery, unsafe sex, sharing needles for drugs or tattoos, etc     Driving. Patient reports a DUI about 6-7 years ago.   Patient denies any engagement in " anything since then.     4. How much time do you spend getting, using or getting over using alcohol or drugs? (DSM)     Patient denies spending a lot of time getting alcohol.   He reports that he would drink/be under the influence; starting after dinner and continuing until bed. He estimated about 4-6 hours spent on drinking per time.   He states that he is unsure of how much time spent recovering as he usually feels okay the next morning.     5. Reasons for drinking/drug use (Use the space below to record answers. It may not be necessary to ask each item.)  Like the feeling no   Trying to forget problems no   To cope with stress no   To relieve physical pain no   To cope with anxiety no   To cope with depression no   To relax or unwind no   Makes it easier to talk with people yes   Partner encourages use no   Most friends drink or use no   To cope with family problems no   Afraid of withdrawal symptoms/to feel better no   Other (specify) To kill/ignore time, to fall asleep     A. What concerns other people about your alcohol or drug use/Has anyone told you that you use too much? What did they say? (DSM)    Patient states that a coworker tried to tell him they were concerned, but was so indirect and convoluted that he was unsure if that is really what they were trying to do/express.     B. What did you think about that/ do you think you have a problem with alcohol or drug use?     He reports that he was more worried about her than himself at that time as she had just experienced the loss of her son to suicide.     6. What changes are you willing to make? What substance are you willing to stop using? How are you going to do that? Have you tried that before? What interfered with your success with that goal?     Patient is willing to stop drinking completley.   He reports that he has stopped currently. He plans to continue his sobriety, but is unsure of how. He reports that he has identified 3-4 things that are worth  "throwing himself into, and that he wants to do that he finds jaymie in. He reports that he would like to find a way to translate his intellectual appreciation for those things into the actual feeling and actually doing those things.   Patient reports that in the past doing things that should make his time enjoyable eventually become bothersome and he become frustrated instead, and that has interfered with him maintaining his sobriety. Patient states that the front end work of learning to do new things becomes tedious instead of enjoyable. He states that he gets stuck on his failures while learning instead of recognizing the progress he has made.     7. What would be helpful to you in making this change?     \"Unsure.\"   Finding things that I genuinely enjoy and are fulfilling.     Dimension IV Ratings   Readiness for Change - The placing authority must use the criteria in Dimension IV to determine a client s readiness for change.   RISK DESCRIPTIONS - Severity ratin  Clinet is motivated with active reinforcement, to explore treatment and strategies for change, but ambivalent about illness or need for change.    REASONS SEVERITY WAS ASSIGNED - (What information did the person provide that supports your assessment of his or her readiness to change? How aware is the person of problems caused by continued use? How willing is she or he to make changes? What does the person feel would be helpful? What has the person been able to do without help?)    Patient reports that he does feel that he has a problem maintaining his sobriety. He appears hesitant to state that his drinking was problematic at this time. Patient reports that he is here for this assessment on his own, despite suspected medical concerns related to his drinking. Patient appears motivated for treatment, although states this will be the only time he reaches out for help, and so treatment needs to work the first time. He appears to lack realistic understanding " "of recovery and wants it to be a systematic solution. Patient was calm, cooperative, and appropriately behaved throughout this appointment.          DIMENSION V - Relapse, Continued Use, and Continued Problem Potential   1. In what ways have you tried to control, cut-down or quit your use? If you have had periods of sobriety, how did you accomplish that? What was helpful? What happened to prevent you from continuing your sobriety? (DSM)     Patient has tried to stop drinking before in the past.   His longest period of sobriety was about 6 weeks in 2017. He reports that he has had 2-3 weeks intermittently since that time as well.   During the 6 weeks he reports that it was Lent, and so he gave it up for that. His mother was also dying at that time.   Patient returned to drinking \"just because.\" He reports that he felt that other things weren't appealing to him anymore, and Lent had ended.     2. Have you experienced cravings? If yes, ask follow up questions to determine if the person recognizes triggers and if the person has had any success in dealing with them.     Patient denies experiencing cravings or urges to drink.   He reports that he doesn't necessary have triggers, but often feels \"What else am I supposed to do.\"     3A. Have you been treated for alcohol/other drug abuse/dependence? no  3B.  Number of times (Lifetime) (over what period):  NA  3C.  Number of times completed treatment (Lifetime):  NA  3D.  During the past 3 years have you participated in outpatient and/or residential?  no  3E.  When and where? NA  3F.  What was helpful?  What was not? NA    4. Support group participation: Have you/do you attend support group meetings to reduce/stop your alcohol/drug use? How recently? What was your experience? Are you willing to restart? If the person has not participated, is he or she willing?     Patient reports that he has gone to a few meetings in the past, but none in the past couple of years.   Patient " "states that he did not like the meetings as he did not feel he was able to related since the others were using other things and had criminal backgrounds.     5. What would assist you in staying sober/straight?     \"I don't know.\"   Having practical advice.     Dimension V Ratings   Relapse/Continued Use/Continued problem potential - The placing authority must use the criteria in Dimension V to determine a client s relapse, continued use, and continued problem potential.   RISK DESCRIPTIONS - Severity rating:  3  Suzieet has poor recognition and understanding of relapse and recidivism issues and displays moderately high vulnerability for further substance use or mental health problems.  Client has few coping skills and rarely applies coping skills.    REASONS SEVERITY WAS ASSIGNED - (What information did the person provide that indicates his or her understanding of relapse issues? What about the person s experience indicates how prone he or she is to relapse? What coping skills does the person have that decrease relapse potential?)     Patient reports no treatment experience, and appears to have no understanding of addiction and recovery. This was evident as he reported that he wants a systematic approach that he can take that will guarantee sustained sobriety. Patient appears to be at an increased risk of relapse at this time due to his lack of knowledge, lack of activity that he finds enjoyable besides drinking, as well as his complete lack of support. Patient may have some coping skills as he reports brief periods of abstinence in the past, however appears to greatly lack as he reports no sustained sobriety.          DIMENSION VI - Recovery Environment   1. Are you employed/attending school? Tell me about that.     Patient works as a  at the [employer].   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   Patient works full-time.   He reports that he has missed about 1 day per " pay period in the past 2 months, due to his drinking.   Patient reports some strain at work due to the missed days. He states that they are looking at everyone currently due to the downsizing.     2A. Describe a typical day; evening for you. Work, school, social, leisure, volunteer, spiritual practices. Include time spent obtaining, using, recovering from drugs or alcohol. (DSM)     Patient reports that a typical day consists of;     On a drinking day; wake up around 3-4am, get to work around 6am, process/evaluate tissue if needed, or cut tissue for surgery, go home, stop at the liquor store on the way home, read, eat dinner, drink from dinner until he goes to sleep around 1-3 am. He reports that sometimes he doesn't sleep at all due to his insomnia.     Patient states that a non-drinking day is the same minus the alcohol.     2B. How often do you spend more time than you planned using or use more than you planned? (DSM)     Patient reports that he spends more time on drinking if he is awake longer than he wanted to be.   He states that he doesn't set limits on his normal use.   He states that he doesn't feel he has issues stopping or sticking to limits he sets for himself when he is drinking in a social setting.     3. How important is using to your social connections? Do many of your family or friends use?     Patient states that he doesn't drink with others usually, and that he doesn't have any close friends.   Patient reports no relationship/contact with family.     4A. Are you currently in a significant relationship?  no  4B.  If yes, how long?    4C. Sexual Orientation:  Heterosexual    5A. Who do you live with? Alone.  5B. Tell me about their alcohol/drug use and mental health issues. NA.  5C. Are you concerned for your safety there? no  5D. Are you concerned about the safety of anyone else who lives with you? NA    6A. Do you have children who live with you? no  If the person lives with children, ask  follow-up questions to determine the person's relationship and responsibility, both legal and care giving; and what arrangements are made for supervision for the children when the person is not available.      6B. Do you have children who do not live with you?  no  If yes, ask follow up questions to learn where the children are, who has custody and what the person't relaltionship and responsibility is with these children and what hopes the person has for his or her future with these children.    7A. Who supports you in making changes in your alcohol or drug use? What are they willing to do to support you? Who is upset or angry about you making changes in your alcohol or drug use? How big a problem is this for you?      Self.   Denies other relationships at this time.     7B. This table is provided to record information about the person s relationships and available support It is not necessary to ask each item; only to get a comprehensive picture of their support system.  How often can you count on the following people when you need someone?   Partner / Spouse    Parent(s)/Aunt(s)/Uncle(s)/Grandparents    Sibling(s)/Cousin(s) Never supportive   Child(marcelo)    Other relative(s)    Friend(s)/neighbor(s)    Child(marcelo) s father(s)/mother(s)    Support group member(s)    Community of tomas members Rarely supportive   /counselor/therapist/healer    Other (specify)      8A. What is your current living situation?     Patient currently lives alone.   Patient indicates a stable living environment.   He lives in a condo that he owns.     8B. What is your long term plan for where you will be living?    Patient plans to stay where he is currently living.     8C. Tell me about your living environment/neighborhood? Ask enough follow up questions to determine safety, criminal activity, availability of alcohol and drugs, supportive or antagonistic to the person making changes.      Safe, no concerns mentioned at this  time.     9. Criminal justice history: Gather current/recent history and any significant history related to substance use--Arrests? Convictions? Circumstances? Alcohol or drug involvement? Sentences? Still on probation or parole? Expectations of the court? Current court order? Any sex offenses - lifetime? What level? (DSM)    DUI (6-7 years ago)  Denies all other charges.     10. What obstacles exist to participating in treatment? (Time off work, childcare, funding, transportation, pending longterm time, living situation)    Work schedule (6 am-5pm)    Dimension VI Ratings   Recovery environment - The placing authority must use the criteria in Dimension VI to determine a client s recovery environment.   RISK DESCRIPTIONS - Severity ratin  Client is engaged in structured, meaningful activity, but peers, family, significant other, living environment are unsupportive, or there is criminal justice involvement by the client or among the client's peers, significatn others, or in the client's environment.    REASONS SEVERITY WAS ASSIGNED - (What support does the person have for making changes? What structure/stability does the person have in his or her daily life that willincrease the likelihood that changes can be sustained? What problems exist in the person s environment that will jeopardize getting/staying clean and sober?)     Patient currently works full-time as a  at the [employer]. He reports current stress at work due to downsizing, and that it is not the most supportive environment. Patient denies any current hobbies or activities that he enjoys outside of his structured time at work. Patient appears to greatly struggle to find meaningful activity outside of work and drinking. Patient reports no close relationships at this time, and so appears to have no support in his life or for his sobriety. Patient reports a stable place to live at this time, however it is where he drinks so it is unclear if it is truly  supportive of sobriety. Patient denies current legal involvement.                Client Choice/Exceptions     Would you like services specific to language, age, gender, culture, Mormonism preference, race, ethnicity, sexual orientation or disability?  no    If yes, specify:      What particular treatment choices and options would you like to have?  Outpatient, Evenings    Do you have a preference for a particular treatment program?  Unsure.         DSM-V Criteria for Substance Abuse  Instructions  Determine whether the client currently meets the criteria for a Substance Use Disorder using the diagnostic criteria in the DSM-V, pp. 481-585. Current means during the most recent 12 months outside a facility that controls access to substances.    Category of substance Severity ICD-10 Code/DSM V Code   [x]  Alcohol Use Disorder [] Mild  [] Moderate  [x] Severe [] (F10.10) (305.00)  [] (F10.20) (303.90)  [x] (F10.20) (303.90)   []  Cannabis Use Disorder [] Mild  [] Moderate  [] Severe [] (F12.10) (305.20)  [] (F12.20) (304.30)  [] (F12.20) (304.30)   [] Hallucinogen Use Disorder [] Mild  [] Moderate  [] Severe [] (F16.10) (305.30)  [] (F16.20) (304.50)  [] (F16.20) (304.50)   []  Inhalant Use Disorder [] Mild  [] Moderate  [] Severe [] (F18.10) (305.90)  [] (F18.20) (304.60)  [] (F18.20) (304.60)   []  Opioid Use Disorder [] Mild  [] Moderate  [] Severe [] (F11.10) (305.50)  [] (F11.20) (304.00)  [] (F11.20) (304.00)   []  Sedative, Hypnotic, or Anxiolytic Use Disorder [] Mild  [] Moderate  [] Severe [] (F13.10) (305.40)   [] (F13.20) (304.10)  [] (F13.20) (304.10)   []  Stimulant Related Disorders [] Mild  [] Moderate  [] Severe [] (F15.10) (305.70) Amphetamine type substance  [] (F14.10) (305.60) Cocaine  [] (F15.10) (305.70) Other or unspecified stimulant  [] (F15.20) (304.40) Amphetamine type substance  [] (F14.20) (304.20) Cocaine  [] (F15.20) (304.40) Other or unspecified stimulant  [] (F15.20) (304.40) Amphetamine  type substance  [] (F14.20) (304.20) Cocaine  [] (F15.20) (304.40) Other or unspecified stimulant   []  Tobacco use Disorder [] Mild  [] Moderate  [] Severe [] (Z72.0) (305.1)  [] (F17.200) (305.1)  [] (F17.200) (305.1)   []  Other (or unknown) Substance Use Disorder [] Mild  [] Moderate  [] Severe [] (F19.10) (305.90)  [] (F19.20) (304.90)  [] (F19.20) (304.90)       Suggested Level of Care Necessary for Recovery  []  Inpatient  []  Extended Care []  Residential [x]  Outpatient  []  None            Collateral Contact Summary   Number of contacts made:  0- Patient refused  Contact with referring person:  yes     If court related records were reviewed, summarize here:  NA     []   Information from collateral contacts supported/largely agreed with information from the client and associated risk ratings.   []   Information from collateral contacts was significantly different from information from the client and lead to different risk ratings.      Summarize new information here:      Rule 25 Assessment Summary and Plan   's Recommendation    Based on the information gathered in this assessment and from collateral information, the client meets DSM IV criteria for Alcohol Use Disorder, Severe (F10.20).   Patient is recommended to attend and complete Intensive Outpatient Treatment and follow all recommendations.   This assessment can be updated with additional information within a 6 month period.      Collateral Contacts     Please duplicate this page for each contact.  If this includes information which is sensitive and not public, separate this page from the rest of the assessment before sharing.  Retain the page in the assessment file.   Name    Medical Chart Relationship     Phone Number     Releases           Information Provided:      Chart indicated the following;     Patient attended a medical appointment on 2/15/19 in attempts to get help with his alcohol use.   Patient reported daily alcohol use of beer  and vodka.   Patient reported his job may be in jeopardy due to his alcohol use and missed work.   Patient appeared shaky at the appointment, but denied withdrawal concerns, and attributed it to having 4 coffee drinks prior.   Labs at that time indicated possible liver disease likely due to his alcohol use.   No other notes at this time from the past year that include alcohol use.     Collateral Contacts     Name    NA   Relationship    NA Phone Number    NA Releases    no       Information Provided:      Patient refused to sign released for collateral contacts. No information gathered at this time.           Staff Name and Title:  PAULO Bowling    Date:  2/20/2019  Time:  1:26 PM

## 2021-06-27 NOTE — PROGRESS NOTES
Progress Notes by Caroline Diaz MD at 2/15/2019  9:10 AM     Author: Caroline Diaz MD Service: -- Author Type: Physician    Filed: 2/20/2019  1:13 PM Encounter Date: 2/15/2019 Status: Signed    : Caroline Diaz MD (Physician)          Assessment:     1. Alcohol use  Comprehensive Metabolic Panel    HM1(CBC and Differential)    INR    GGT (Gamma GT)    Ammonia    Thyroid Cascade    HM1 (CBC with Diff)    Ambulatory referral to Chemical Dependency    Drug Abuse 1+, Urine    Alcohol, Ethyl, Urine Screen    Ambulatory referral to Gastroenterology   2. Flapping tremor  Ambulatory referral to Chemical Dependency    Drug Abuse 1+, Urine    Alcohol, Ethyl, Urine Screen    Ambulatory referral to Gastroenterology   3. Elevated blood pressure reading without diagnosis of hypertension  Drug Abuse 1+, Urine    Alcohol, Ethyl, Urine Screen   4. Increased ammonia level       Reviewed his medical records at [facility] that appear in care everywhere.  Noted that he was seen in May/2017 when the police was called by his sister who was worried that he was having suicidal ideation when he had texted her in what seems to be acute alcohol intoxication.  He was discharged at that time.  He did not follow up.    GRADE 1- Mild Hepatic encephalopathy  Euphoria/depression Asterixis -    Yes/no Usually normal   Mild confusion     Slurred speech     Disordered sleep         Plan:      The diagnosis was discussed with the patient and evaluation and treatment plans outlined.  See orders for lab and imaging studies.  Further follow-up plans will be based on outcome of lab/imaging studies; see orders.     Patient Instructions   I have seen you today for chemical dependency evaluation.    Based on clinical signs and symptoms, it seems you are at risk of withdrawal.  As discussed with you this is noting extremely high blood pressure and tremors.    You have declined to go to inpatient right now.  Again, I emphasize that you  be seen for inpatient evaluation at [facility]    I have referred you addction clinic at [facility].  However, you may not be able to see them right away.    Here is [facility] connection number-[phone].  Please call if you have any worrisome signs.    Patient Education: Alcohol withdrawal-please see AVS      Subjective:      Antwan Conn is a  male who presents for evaluation of   Chief Complaint   Patient presents with   ? Annual Exam     new pt physical- pt is not fasting     Patient schedule this exam for a physical, however informs me that this is not for a physical exam.  Patient is here today for a chemical dependency evaluation.  He informs me that this is a self-referral.    Patient informs me that he has been using alcohol daily.  He has also increase his alcohol from just beer intake to using vodka that he does not even like.  I he informs me that his job may be in jeopardy because he has taken some time off from work.  He does not admit if this is related to his alcohol use.  He does not inform me if work has asked him to follow with the primary physician.    Patient looks tremulous and has shakes and tremors and I asked him about withdrawal signs and he absolutely denies.  He keeps repeating that he had 4 Frappuccino this morning.  He attributes his tremors to caffeine intake.    He informs me that though he is getting dependent on alcohol, he has never had a withdrawal and he can go for a few days without drinking alcohol.  He informs me that his last intake was about 48 hours ago.    He denies any nausea/vomiting/diarrhea.  He denies any chest pain or shortness of breath.  He denies any skin lesion or bruising or bleeding.  He denies any hemoptysis or melena or bright red blood per rectum.    He just wants me to refer to chemical dependency program that can preferably be in the evening.    He absolutely does not want to go to any emergency room or inpatient chemical dependency.      The  following portions of the patient's history were reviewed and updated as appropriate: allergies, current medications, past family history, past medical history, past social history, past surgical history and problem list.  No Known Allergies    Current Outpatient Medications on File Prior to Visit   Medication Sig Dispense Refill   ? eszopiclone (LUNESTA) 2 MG Tab Take 2 mg by mouth.     ? multivitamin therapeutic tablet Take 1 tablet by mouth daily.     ? traZODone (DESYREL) 50 MG tablet TK 1 TO 2 TS PO AT BEDTIME PRF SLEEP  0     No current facility-administered medications on file prior to visit.        There is no problem list on file for this patient.      No past medical history on file.    No past surgical history on file.    Family History   Problem Relation Age of Onset   ? Hypertension Mother    ? Dementia Mother 70   ? Multiple myeloma Father 71   ? Stroke Maternal Grandfather    ? Heart disease Paternal Grandmother        Social History     Socioeconomic History   ? Marital status: Single     Spouse name: None   ? Number of children: None   ? Years of education: None   ? Highest education level: None   Occupational History   ? None   Social Needs   ? Financial resource strain: None   ? Food insecurity:     Worry: None     Inability: None   ? Transportation needs:     Medical: None     Non-medical: None   Tobacco Use   ? Smoking status: Never Smoker   ? Smokeless tobacco: Never Used   Substance and Sexual Activity   ? Alcohol use: Yes     Frequency: 4 or more times a week     Drinks per session: 10 or more     Binge frequency: Weekly     Comment: More Vodka   ? Drug use: None   ? Sexual activity: None   Lifestyle   ? Physical activity:     Days per week: None     Minutes per session: None   ? Stress: None   Relationships   ? Social connections:     Talks on phone: None     Gets together: None     Attends [employer] service: None     Active member of club or organization: None     Attends meetings of clubs  "or organizations: None     Relationship status: None   ? Intimate partner violence:     Fear of current or ex partner: None     Emotionally abused: None     Physically abused: None     Forced sexual activity: None   Other Topics Concern   ? None   Social History Narrative    Single and no children. Lives by self.     Has siblings in the [place].    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        Caroline Diaz MD  2/15/2019           Review of Systems  A 12 point comprehensive review of systems was negative except as noted.       Objective:   BP (!) 168/105 (Patient Site: Left Arm, Patient Position: Sitting, Cuff Size: Adult Large)   Pulse (!) 114   Temp 97.6  F (36.4  C) (Oral)   Resp 16   Ht 6' 0.5\" (1.842 m)   Wt 206 lb (93.4 kg)   BMI 27.55 kg/m      GENERAL APPEARANCE:  Appearing stated age, appears slightly tremulous and shaky.   HEENT: Pupils equal, regular, react to light and accommodation. Extraocular muscles intact, fundi benign. Ear canals and tympanic membranes are normal. Lips, mouth, and throat are unremarkable.   NECK: Neck supple without adenopathy, thyromegaly or masses.   LYMPH: No anterior cervical or supraclavicular LN enlargement   PULMONARY: Normal respiratory effort. Chest is clear.   CARDIOVASCULAR: Heart auscultation: rhythm regular, heart sounds normal S1 and S2, bruit carotid artery absent.   SKIN: Warm and well perfused..   ABDOMEN: Abdomen soft, non-tender. BS normal. No masses or organomegaly.   MUSCULOSKELETAL: No obvious joint swelling, deformity or limitation in range of motion, full range of motion of the back and neck without pain, strength normal and symmetric in all muscle groups.  Flapping tremors are noted.   EXTREMITIES: Peripheral pulses are full. Extremities with no edema.   MENTAL STATUS: Alert, oriented and thought content appropriate   NEUROLOGIC: Station and gait normal, strength and movement normal, reflexes are normal and " symmetric

## 2021-07-04 ENCOUNTER — TELEPHONE (OUTPATIENT)
Dept: BEHAVIORAL HEALTH | Facility: CLINIC | Age: 51
End: 2021-07-04

## 2021-07-04 ENCOUNTER — HOSPITAL ENCOUNTER (EMERGENCY)
Dept: EMERGENCY MEDICINE | Facility: HOSPITAL | Age: 51
Discharge: HOME | End: 2021-07-04
Attending: EMERGENCY MEDICINE
Payer: COMMERCIAL

## 2021-07-04 ENCOUNTER — COMMUNICATION - HEALTHEAST (OUTPATIENT)
Dept: CARE COORDINATION | Facility: HOSPITAL | Age: 51
End: 2021-07-04

## 2021-07-04 DIAGNOSIS — R45.851 SUICIDAL IDEATION: ICD-10-CM

## 2021-07-04 DIAGNOSIS — F10.220 ACUTE ALCOHOLIC INTOXICATION IN ALCOHOLISM WITHOUT COMPLICATION (H): ICD-10-CM

## 2021-07-04 DIAGNOSIS — F32.A DEPRESSION, UNSPECIFIED DEPRESSION TYPE: ICD-10-CM

## 2021-07-04 LAB
ALBUMIN SERPL-MCNC: 4.6 G/DL (ref 3.5–5)
ALP SERPL-CCNC: 71 U/L (ref 45–120)
ALT SERPL W P-5'-P-CCNC: 24 U/L (ref 0–45)
ANION GAP SERPL CALCULATED.3IONS-SCNC: 17 MMOL/L (ref 5–18)
AST SERPL W P-5'-P-CCNC: 37 U/L (ref 0–40)
BILIRUB DIRECT SERPL-MCNC: 0.4 MG/DL
BILIRUB SERPL-MCNC: 1.4 MG/DL (ref 0–1)
BUN SERPL-MCNC: 10 MG/DL (ref 8–22)
CALCIUM SERPL-MCNC: 9.4 MG/DL (ref 8.5–10.5)
CHLORIDE BLD-SCNC: 110 MMOL/L (ref 98–107)
CO2 SERPL-SCNC: 21 MMOL/L (ref 22–31)
CREAT SERPL-MCNC: 0.69 MG/DL (ref 0.7–1.3)
ERYTHROCYTE [DISTWIDTH] IN BLOOD BY AUTOMATED COUNT: 11.4 % (ref 11–14.5)
ETHANOL SERPL-MCNC: 312 MG/DL
GFR SERPL CREATININE-BSD FRML MDRD: >60 ML/MIN/1.73M2
GLUCOSE BLD-MCNC: 117 MG/DL (ref 70–125)
HCT VFR BLD AUTO: 45.6 % (ref 40–54)
HGB BLD-MCNC: 15.9 G/DL (ref 14–18)
MCH RBC QN AUTO: 30.7 PG (ref 27–34)
MCHC RBC AUTO-ENTMCNC: 34.9 G/DL (ref 32–36)
MCV RBC AUTO: 88 FL (ref 80–100)
PLATELET # BLD AUTO: 281 THOU/UL (ref 140–440)
PMV BLD AUTO: 10 FL (ref 8.5–12.5)
POTASSIUM BLD-SCNC: 3.6 MMOL/L (ref 3.5–5)
PROT SERPL-MCNC: 7.5 G/DL (ref 6–8)
RBC # BLD AUTO: 5.18 MILL/UL (ref 4.4–6.2)
SARS-COV-2 PCR RESULT-HE - HISTORICAL: NEGATIVE
SODIUM SERPL-SCNC: 148 MMOL/L (ref 136–145)
WBC: 5.5 THOU/UL (ref 4–11)

## 2021-07-04 NOTE — TELEPHONE ENCOUNTER
S:  ARNEL Gallego called with 51y/M in [facility] for SI and intoxication.     B:  Pt was BIB Police/Medics after his employer called 911.  Pt texted employer intent to kill self.  Pt has missed work for 2 days, been increasingly depressed and drinking.  Pt endorses feeling hopeless, depressed, and wanting to kill self.   Pt has a hx of depression and alcohol abuse.   Pt states he hates his life, job and is very reclusive.   Pt reports he takes a low dose of Zoloft and takes as prescribed, and has a psychiatrist in community.    Pt very guarded.    A:  Vol    R:  Patient cleared and ready for behavioral bed placement: Yes   Pt placed on worklist pending bed availablity @ 2:13pm

## 2021-07-04 NOTE — TELEPHONE ENCOUNTER
Telephone Encounter by Analilia Willoughby RN at 7/4/2021  2:53 PM     Author: Analilia Willoughby RN Service: -- Author Type: Coordinator    Filed: 7/4/2021  2:53 PM Encounter Date: 7/4/2021 Status: Signed    : Analilia Willoughby RN (Coordinator)       S:  ARNEL Gallego called with 51y/M in [facility] ER for SI and intoxication.     B:  Pt was BIB [employer] after his employer called 911.  Pt texted employer intent to kill self.  Pt has missed work for 2 days, been increasingly depressed and drinking.  Pt endorses feeling hopeless, depressed, and wanting to kill self.   Pt has a hx of depression and alcohol abuse.   Pt states he hates his life, job and is very reclusive.   Pt reports he takes a low dose of Zoloft and takes as prescribed, and has a psychiatrist in community.    Pt very guarded.    A:  Vol    R:  Patient cleared and ready for behavioral bed placement: Yes   Pt placed on worklist pending bed availablity @ 2:13pm

## 2021-07-04 NOTE — ED NOTES
ED Notes by Karlie Swift RN at 7/4/2021  3:07 PM     Author: Karlie Swift RN Service: -- Author Type: Registered Nurse    Filed: 7/4/2021  3:09 PM Date of Service: 7/4/2021  3:07 PM Status: Signed    : Karlie Swift RN (Registered Nurse)       Pt continues on a watch for safety. Pt ate a sandwich, drinking water, declines ordering a meal from cafeteria. Pt refuses to change into scrubs but otherwise redirectable and thus far voluntary to stay for evaluation.

## 2021-07-04 NOTE — ED NOTES
ED Notes by Karlie Swift RN at 7/4/2021  1:15 PM     Author: Karlie Swift RN Service: -- Author Type: Registered Nurse    Filed: 7/4/2021  1:38 PM Date of Service: 7/4/2021  1:15 PM Status: Signed    : Karlie Swift RN (Registered Nurse)       Pt's brother, Kenyon, can be reached at [phone] with questions or concerns. Pt spoke with his brother on the phone

## 2021-07-04 NOTE — ED PROVIDER NOTES
"ED Provider Notes by Isabella Talbot MD at 7/4/2021 10:58 AM     Author: Isabella Talbot MD Service: -- Author Type: Physician    Filed: 7/4/2021  3:50 PM Date of Service: 7/4/2021 10:58 AM Status: Signed    : Isabella Talbot MD (Physician)       EMERGENCY DEPARTMENT ENCOUNTER      NAME: Antwan Conn  AGE: 51 y.o. male  YOB: 1970  MRN: [id number]  EVALUATION DATE & TIME: 7/4/2021 10:46 AM    PCP: Caroline Diaz MD    ED PROVIDER: Isabella Talbot MD    Chief Complaint   Patient presents with   ? Psychiatric Evaluation         FINAL IMPRESSION:  1. Suicidal ideation    2. Acute alcoholic intoxication in alcoholism without complication (H)          ED COURSE & MEDICAL DECISION MAKING:    Pertinent Labs & Imaging studies reviewed. (See chart for details)  51 y.o. male with history of alcoholism with previous subdural related to intoxicated head trauma, abnormal LFTs who presents to the Emergency Department for evaluation of evaluation of a suicidal statement that he made to his boss over the phone after he no call no-show to work for last 2 days.  Admits to binge drinking recently but vehemently denies any suicidal ideation stating that this is a misunderstanding and he was simply trying to get out of work.  Patient however lives independently by himself, is a 51-year-old male who self report \"hates my job\", has a history of alcoholism and has been binge drinking recently and is certainly at high risk for suicide.  I suspect there is some degree of chronic depression complicated by substance-induced mood disorder.  Patient is certainly holdable and collateral information will be helpful.    Patient initially defiant and not wanting to cooperate but able to be verbally deescalated is agreeable to proceed with laboratory work-up to check blood alcohol level etc.  CBC, BMP, LFTs, blood alcohol level notable for T bili of 1.4, blood alcohol level of 312.  Social work consulted for assessment, will " "plan on bed placement.  Patient is currently cooperative and voluntary but is certainly holdable.     10:55 AM I met with the patient, obtained history, performed an initial exam, and discussed options and plan for diagnostics and treatment here in the ED.     At the conclusion of the encounter I discussed the results of all of the tests and the disposition. The questions were answered. The patient or family acknowledged understanding and was agreeable with the care plan.     CONSULTS:  Psych SW      MEDICATIONS GIVEN IN THE EMERGENCY:  Medications   acetaminophen tablet 1,000 mg (TYLENOL) (has no administration in time range)   ibuprofen tablet 400 mg (ADVIL,MOTRIN) (has no administration in time range)       NEW PRESCRIPTIONS STARTED AT TODAY'S ER VISIT  Current Discharge Medication List      CONTINUE these medications which have NOT CHANGED    Details   multivitamin therapeutic tablet Take 1 tablet by mouth daily.                =================================================================    HPI    Patient information was obtained from: Patient    Use of Intrepreter: N/A       Antwan SUKHDEV Conn is a 51 y.o. male with a history of depression, subdural hematoma, and alcohol use disorder, who presents, via EMS, for a crisis evaluation.    The patient is brought in by EMS after he made a suicidal statement over the phone to his boss earlier today.  The patient's boss called EMS out of concern for the patient.  The patient reports feeling depressed and states \"I just did not want to work the last couple of days\".  No suicidal ideation or plan,  He has been drinking the past few days, and he notes being sober two months before the last few days.  He denies that him drinking is why he does not want to go to work.  The patient reports being on an anti-depressant, doesn't know which one.  He has been taking this and thinks it is working.  He lives alone and states \"I am a recluse\".  The only other medical problem he " endorses is liver problems which he is unsure if is due to alcohol use.  He thinks his last liver function test was 2-3 years ago.  No other complaints at this time.  Per chart review it looks like he has been getting sertraline filled for depression.      REVIEW OF SYSTEMS limited as patient is evasive and uncooperative questions.  Constitutional:  Denies fever, chills, weight loss or weakness  Neurologic:  Denies headache, focal weakness or sensory changes  Psychological: Positive for feelings of depression.  No suicidal thoughts or plan.    All other systems negative unless noted in HPI.      PAST MEDICAL HISTORY:  SDH  Depression  Alcohol use disorder    PAST SURGICAL HISTORY:  History reviewed. No pertinent surgical history.        CURRENT MEDICATIONS:    No current facility-administered medications on file prior to encounter.      Current Outpatient Medications on File Prior to Encounter   Medication Sig   ? multivitamin therapeutic tablet Take 1 tablet by mouth daily.       ALLERGIES:  No Known Allergies    FAMILY HISTORY:  Family History   Problem Relation Age of Onset   ? Hypertension Mother    ? Dementia Mother 70   ? Multiple myeloma Father 71   ? Stroke Maternal Grandfather    ? Heart disease Paternal Grandmother        SOCIAL HISTORY:   Social History     Socioeconomic History   ? Marital status: Single     Spouse name: None   ? Number of children: None   ? Years of education: None   ? Highest education level: None   Occupational History   ? None   Social Needs   ? Financial resource strain: None   ? Food insecurity     Worry: None     Inability: None   ? Transportation needs     Medical: None     Non-medical: None   Tobacco Use   ? Smoking status: Never Smoker   ? Smokeless tobacco: Never Used   Substance and Sexual Activity   ? Alcohol use: Yes     Frequency: 4 or more times a week     Drinks per session: 10 or more     Binge frequency: Weekly     Comment: More Vodka   ? Drug use: None   ? Sexual  "activity: None   Lifestyle   ? Physical activity     Days per week: None     Minutes per session: None   ? Stress: None   Relationships   ? Social connections     Talks on phone: None     Gets together: None     Attends Cheondoism service: None     Active member of club or organization: None     Attends meetings of clubs or organizations: None     Relationship status: None   ? Intimate partner violence     Fear of current or ex partner: None     Emotionally abused: None     Physically abused: None     Forced sexual activity: None   Other Topics Concern   ? None   Social History Narrative    Single and no children. Lives by self.     Has siblings in the [place].    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        Caroline Diaz MD  2/15/2019           VITALS:  Patient Vitals for the past 24 hrs:   BP Temp Temp src Pulse Resp SpO2 Weight   07/04/21 1521 (!) 159/96 98.2  F (36.8  C) Oral (!) 124 20 97 % --   07/04/21 1127 -- -- -- (!) 108 18 96 % --   07/04/21 1051 (!) 172/96 98  F (36.7  C) Temporal (!) 133 22 97 % 220 lb (99.8 kg)       PHYSICAL EXAM    General Appearance: Well-appearing, well-nourished, no acute distress   Head:  Normocephalic, atraumatic  Eyes: conjunctiva/corneas clear  ENT:  membranes are moist without pallor  Neck:  Supple  Cardio: Tachycardic, normalized upon recheck, regular rhythm  Pulm:  No respiratory distress  Abdomen:  Soft, non-tender, non distended,no rebound or guarding.  Extremities: Moves all extremities normally, normal gait  Skin:  Skin warm, dry, no rashes  Neuro:  Alert and oriented ×3, moving all extremities, no gross sensory defects no tremors  Phychiatric: Poor eye contact, admits to being depressed, adamantly denies any suicidal ideation or plan.  Evasive with questions and admits to \"being a recluse\".       LAB:  All pertinent labs reviewed and interpreted.  Results for orders placed or performed during the hospital encounter of 07/04/21 "   ETOH Level   Result Value Ref Range    Alcohol, Blood 312 (H) None detected mg/dL   HM2 (CBC W/O DIFF)   Result Value Ref Range    WBC 5.5 4.0 - 11.0 thou/uL    RBC 5.18 4.40 - 6.20 mill/uL    Hemoglobin 15.9 14.0 - 18.0 g/dL    Hematocrit 45.6 40.0 - 54.0 %    MCV 88 80 - 100 fL    MCH 30.7 27.0 - 34.0 pg    MCHC 34.9 32.0 - 36.0 g/dL    RDW 11.4 11.0 - 14.5 %    Platelets 281 140 - 440 thou/uL    MPV 10.0 8.5 - 12.5 fL   Basic Metabolic Panel   Result Value Ref Range    Sodium 148 (H) 136 - 145 mmol/L    Potassium 3.6 3.5 - 5.0 mmol/L    Chloride 110 (H) 98 - 107 mmol/L    CO2 21 (L) 22 - 31 mmol/L    Anion Gap, Calculation 17 5 - 18 mmol/L    Glucose 117 70 - 125 mg/dL    Calcium 9.4 8.5 - 10.5 mg/dL    BUN 10 8 - 22 mg/dL    Creatinine 0.69 (L) 0.70 - 1.30 mg/dL    GFR MDRD Af Amer >60 >60 mL/min/1.73m2    GFR MDRD Non Af Amer >60 >60 mL/min/1.73m2   Hepatic Profile   Result Value Ref Range    Bilirubin, Total 1.4 (H) 0.0 - 1.0 mg/dL    Bilirubin, Direct 0.4 <=0.5 mg/dL    Protein, Total 7.5 6.0 - 8.0 g/dL    Albumin 4.6 3.5 - 5.0 g/dL    Alkaline Phosphatase 71 45 - 120 U/L    AST 37 0 - 40 U/L    ALT 24 0 - 45 U/L   Asymptomatic SARS-CoV-2 (COVID-19)-PCR    Specimen: Respiratory   Result Value Ref Range    SARS-CoV-2 PCR Result Negative Negative, Invalid       RADIOLOGY:  Reviewed all pertinent imaging. Please see official radiology report.  No results found.    I, Omar Garcia, am serving as a scribe to document services personally performed by Dr. Talbot based on my observation and the provider's statements to me. I, Isabella Talbot MD attest that Omar Garcia is acting in a scribe capacity, has observed my performance of the services and has documented them in accordance with my direction.    Isabella Talbot MD  Emergency Medicine  [facility] EMERGENCY DEPARTMENT  [address]  [facility] [address]  Dept:  [phone]  Loc: [phone]        Isabella Talbot MD  07/04/21 1554

## 2021-07-04 NOTE — ED NOTES
"ED Notes by Karlie Switf RN at 7/4/2021  3:28 PM     Author: Karlie Swift RN Service: -- Author Type: Registered Nurse    Filed: 7/4/2021  3:55 PM Date of Service: 7/4/2021  3:28 PM Status: Addendum    : Karlie Swift RN (Registered Nurse)    Related Notes: Original Note by Karlie Swift RN (Registered Nurse) filed at 7/4/2021  3:31 PM       Pt adamantly denies withdrawal symptoms. Pt anxious and upset, does not understand why he is here. Explained rationale and pt agreeable to stay. Pt was not aware he was not very forthcoming with  evaluation, pt states \"I'm Sofia Shinto and I shared more with her than I do with most people\". Pt asked if he could have another opportunity to do crisis evaluation this evening, and wants to cooperate. Pt declines any withdrawal concerns or need for medications. Pt continues to deny suicidal or homicidal ideation, no previous suicide attempts. Denies hx withdrawal seizures.       "

## 2021-07-04 NOTE — ED NOTES
ED Notes by Karlie Swift RN at 7/4/2021 12:02 PM     Author: Karlie Swift RN Service: -- Author Type: Registered Nurse    Filed: 7/4/2021 12:03 PM Date of Service: 7/4/2021 12:02 PM Status: Signed    : Karlie Swift RN (Registered Nurse)        called, given ipad information and ipad set-up at bedside. Instructed pt of plan for  evaluation and elevated alcohol level, pt agrees to speak with .

## 2021-07-04 NOTE — ED NOTES
ED Notes by Karlie Swift RN at 7/4/2021  5:15 PM     Author: Karlie Swift RN Service: -- Author Type: Registered Nurse    Filed: 7/4/2021  5:16 PM Date of Service: 7/4/2021  5:15 PM Status: Signed    : Karlie Swift RN (Registered Nurse)       Called TRAE Carrasco to discuss reassessment this evening. Luna does not currently have Epic access so chart information was faxed to her office and Luna provided with Ipad numbers to do remote call-in to room.

## 2021-07-04 NOTE — ED PROVIDER NOTES
"ED Provider Notes by Huey Montemayor MD at 7/4/2021  4:42 PM     Author: Huey Montemayor MD Service: -- Author Type: Physician    Filed: 7/4/2021  6:54 PM Date of Service: 7/4/2021  4:42 PM Status: Signed    : Huey Montemayor MD (Physician)       ED Behavioral Health Patient Transition of Care Note    Assuming care from:  Dr. Isabella Talbot  Brief history:   The patient is brought in by EMS after he made a suicidal statement over the phone to his boss earlier today.  The patient's boss called EMS out of concern for the patient.  The patient reports feeling depressed and states \"I just did not want to work the last couple of days\".  No suicidal ideation or plan,  He has been drinking the past few days, and he notes being sober two months before the last few days.  He denies that him drinking is why he does not want to go to work.  The patient reports being on an anti-depressant, doesn't know which one.  He has been taking this and thinks it is working.  He lives alone and states \"I am a recluse\".  The only other medical problem he endorses is liver problems which he is unsure if is due to alcohol use.  He thinks his last liver function test was 2-3 years ago.  No other complaints at this time.  Per chart review it looks like he has been getting sertraline filled for depression.    Any outstanding medical concerns:  No, medically stable for  admission    Has  seen the patient:  No    Is the patient on a hold:  Voluntary but Holdable    Current plan for disposition:  Awaiting  evaluation    Safety concerns:  Patient has been Cooperative    Dietary restrictions:  None, pt can eat and drink ad jose    Have daily medications been ordered:  no daily meds needed    Significant events during shift: At the time of signout patient reportedly had not been particularly cooperative with initial assessment.  The plan of care at signout was for admission to mental health facility.  He was acutely intoxicated " "with an EtOH of 312.  Blood work otherwise unremarkable.  As patient progressed in the emergency department awaiting admission to the hospital he became more sober and more cooperative.  He apologized for his earlier behavior.  Reported that he is frustrated with his job and frustrated with his boss and feels that his drinking along with that frustration led to his comments earlier.  He denies being suicidal.  He is requesting discussion with .  Patient is appearing more sober at this time compared to initial interactions based on review of the records and per his request we have reached out to  for them to engage the patient again.  Apparently he was not answering questions clearly the first interaction so we will allow crisis services to reassess the patient per his request.  He is calm and cooperative at this time.    6:31 PM  Services crisis worker has completed their full assessment and completed the reassessment of the patient.  Patient is appearing sober and cooperative at this time.  I had a discussion with crisis services who felt that the patient was okay for discharge home he has good resources and appropriate follow-up in place and they recommended discharge from the emergency department.  Plan of care is to go in and reassessed the patient.    6:52 PM  Reassessed the patient and had a long conversation with him.  He was alert, appropriate, no signs of clinical intoxication.  Appropriately reflective.  He reports that he knew he was acutely intoxicated this morning and he does not like his job he finds it to be significantly stressful combination of facing another stressful day at work and also knowing that he was intoxicated let him to make those comments to his boss.  He reports he is not suicidal he would \"never do that\".  He is feeling significantly improved.  I spent a significant amount of time with the patient talking with him about the events of this morning his " alcohol intake.  He appeared to be appropriately reflective.  He was appropriately remorseful.  He does have follow-up with a therapist that he sees a once a week on Monday.  He is requesting discharge from the emergency department.  Clinically the patient appears to be sober.  He appears to have appropriate medical capacity at this point and is not voicing any suicidal or homicidal ideation.  I think we can proceed with discharge and continued outpatient management.  I discussed all this with the patient reviewed appropriate follow-up and return precautions prior to discharge.    1. Suicidal ideation    2. Acute alcoholic intoxication in alcoholism without complication (H)               Huey Montemayor MD  07/04/21 6990

## 2021-07-04 NOTE — ED TRIAGE NOTES
"ED Triage Notes by Marcus Gonzalez, RN at 7/4/2021 10:49 AM     Author: Marcus Gonzalez RN Service: -- Author Type: Registered Nurse    Filed: 7/4/2021 10:51 AM Date of Service: 7/4/2021 10:49 AM Status: Signed    : Marcus Gonzalez, RN (Registered Nurse)       Pt has been no call/no show to work for past few days. When worked called for welfare check, pt stated he \"wanted to kill himself\"  On arrival patient declines SI and says he said that because he didn't want to go to work.  Per EMS multiple bottles of Vodka around at home, pt admits to drinking.        "

## 2021-07-05 ENCOUNTER — COMMUNICATION - HEALTHEAST (OUTPATIENT)
Dept: SCHEDULING | Facility: CLINIC | Age: 51
End: 2021-07-05

## 2021-07-06 VITALS — WEIGHT: 220 LBS | BODY MASS INDEX: 29.43 KG/M2

## 2021-08-04 ENCOUNTER — HOSPITAL ENCOUNTER (EMERGENCY)
Facility: HOSPITAL | Age: 51
Discharge: HOME | End: 2021-08-04
Attending: STUDENT IN AN ORGANIZED HEALTH CARE EDUCATION/TRAINING PROGRAM | Admitting: STUDENT IN AN ORGANIZED HEALTH CARE EDUCATION/TRAINING PROGRAM
Payer: MEDICAID

## 2021-08-04 VITALS
DIASTOLIC BLOOD PRESSURE: 101 MMHG | HEART RATE: 123 BPM | WEIGHT: 210 LBS | RESPIRATION RATE: 22 BRPM | TEMPERATURE: 98 F | BODY MASS INDEX: 28.09 KG/M2 | OXYGEN SATURATION: 97 % | SYSTOLIC BLOOD PRESSURE: 135 MMHG

## 2021-08-04 DIAGNOSIS — F10.10 ALCOHOL ABUSE: ICD-10-CM

## 2021-08-04 PROCEDURE — 99283 EMERGENCY DEPT VISIT LOW MDM: CPT

## 2021-08-04 NOTE — ED TRIAGE NOTES
Patient arrives by medics for evaluation of alcohol intoxication.   Patient was found walking on the street, PD concerned that patient was unable to care for himself.  Placed on health officer hold.  Patient has abrasions on his left elbow and multiple bruises on bilateral arms.

## 2021-08-04 NOTE — ED NOTES
Bed: JNED-11  Expected date: 8/4/21  Expected time: 6:08 PM  Means of arrival: Ambulance  Comments:  Pt on hold, ETOH

## 2021-08-04 NOTE — ED PROVIDER NOTES
EMERGENCY DEPARTMENT ENCOUNTER       ED Course & Medical Decision Making   6:57 PM I met with the patient, obtained history, performed an initial exam, and discussed options and plan for diagnostics and treatment here in the ED.    Final Impression  51 year old male brought in by police for evaluation of alcohol intoxication. Patient was reportedly walking out a few blocks from his house, was spotted by the police with whom he is familiar due to his reported history of recurrent alcohol use problems and violations related to alcohol abuse. Patient denies medical complaints other than some generalized body aches which has been ongoing for several weeks. Denies fevers or chills, denies nausea vomiting. Denies suicidal or homicidal ideation. Does admit to alcohol abuse, though is alert, ambulates independently with a steady gait, alert and oriented x3. Patient is not clinically intoxicated, has decision-making capacity. Requesting to be discharged on initial evaluation. States he does have resources for detox and sobriety, has a contact that is supportive, does plan on contacting them.    Prior to making a final disposition on this patient the results of patient's tests and other diagnostic studies were discussed with the patient. All questions were answered. Patient expressed understanding of the plan and was amenable to it.    Medications - No data to display    Final Impression     1. Alcohol abuse        Chief Complaint     Chief Complaint   Patient presents with     Alcohol Intoxication     No notes on file    HPI     Ryder Conn is a 51 year old male with a pertinent medical history of abnormal LFTs, alcoholism, depression, and SDH, who presents for evaluation of alcohol intoxication.    Prior to arrival to the ED, the patient reports that he was out to drink and was found by the  three blocks from his house. He states that he is on probation and not supposed to be drinking, and the  would not let  him go home.    Patient reports that his whole body has been hurting for the last two weeks, but denies any other medical complaints at this time. Patient denies and suicidal ideation or homicidal ideation. Patient states that he is currently in treatment for his drinking, and denies ever having been to detox. He states that he has resources for help and support, and mentions that he would like to go home.    I, Gennaro Sriram am serving as a scribe to document services personally performed by Dr. Fortunato Meyer MD, based on my observation and the provider's statements to me. I, Dr. Fortunato Meyer MD attest that Gennaro Bhatia is acting in a scribe capacity, has observed my performance of the services and has documented them in accordance with my direction.    Past Medical History     Past Medical History:   Diagnosis Date     Abnormal LFTs      Alcoholism (H)      Depression      Subdural hematoma (H)      No past surgical history on file.  Family History   Problem Relation Age of Onset     Hypertension Mother      Dementia Mother 70.00     Multiple myeloma Father 71.00     Cerebrovascular Disease Maternal Grandfather      Heart Disease Paternal Grandmother       Social History     Tobacco Use     Smoking status: Never Smoker     Smokeless tobacco: Never Used   Substance Use Topics     Alcohol use: Yes     Comment: Alcoholic Drinks/day: More Vodka     Drug use: None       Relevant past medical, surgical, family and social history as documented above, has been reviewed and discussed with patient. No changes or additions, unless otherwise noted in the HPI.    Current Medications     acetaminophen (TYLENOL) 325 MG tablet  folic acid (FOLVITE) 1 MG tablet  levETIRAcetam (KEPPRA) 500 MG tablet  thiamine (B-1) 100 MG tablet        Allergies     No Known Allergies    Review of Systems     Constitutional: Denies fever  Respiratory: Denies shortness of breath    Cardiovascular: Denies chest pain  GI: Denies abdominal  pain, nausea, vomiting  Musculoskeletal:  Endorses whole body pain.  Neurologic: Denies current headache, new weakness, focal weakness, or sensory changes    Psychiatric: Denies depression, suicidal ideation or homicidal ideation    Remainder of systems reviewed, unless noted in HPI all others negative.    Physical Exam     BP (!) 135/101   Pulse (!) 123   Temp 98  F (36.7  C) (Oral)   Resp 22   Wt 95.3 kg (210 lb)   SpO2 97%   BMI 28.09 kg/m    Constitutional: Awake, alert, in no acute distress  Head: Normocephalic, atraumatic.  ENT: Mucous membranes moist.   Eyes: PERRL, Conjunctiva normal  Respiratory: Respirations even, unlabored. Lungs clear to ascultation bilaterally, in no acute respiratory distress.  Cardiovascular: Sinus tachycardia, rate approximately 110. No murmurs.  GI: Abdomen soft, non-tender No guarding or rebound. Bowel sounds intact on all 4 quadrants.   Musculoskeletal: Moves all 4 extremities equally, strength symmetrical on bilateral uppers and lowers.  Integument: Warm, dry.  Neurologic: Does appear to have some EtOH on board, though is alert & oriented x 3, ambulates with a steady gait. Normal speech. Has decision-making capacity and exhibits reasonable insight into his alcohol abuse problem. Grossly normal motor and sensory function. No focal deficits noted.  Psychiatric: Calm, cooperative. Does seem frustrated by his situation, though adamantly denies SI or HI.       Fortunato Meyer MD  08/06/21 1027

## 2021-08-05 NOTE — ED NOTES
"Patient denies SI/HI, states \"I am Yazdanism and would never do that\"  Reports recent loss of employment.   "

## 2021-08-05 NOTE — ED NOTES
"Patient states he does not want to be here, states he is wasting our time.  He wants to call his \"contact\" informed patient that he is free to make as many phone calls as he would like.  1:1 placed outside of door as patient wants to leave.  Arrived on health officer hold.   "

## 2021-08-05 NOTE — DISCHARGE INSTRUCTIONS
We recommend you decrease your alcohol use and follow-up closely with Dr. Rodriguez at [facility].

## 2021-10-02 ENCOUNTER — HEALTH MAINTENANCE LETTER (OUTPATIENT)
Age: 51
End: 2021-10-02

## 2022-03-19 ENCOUNTER — HEALTH MAINTENANCE LETTER (OUTPATIENT)
Age: 52
End: 2022-03-19

## 2023-01-14 ENCOUNTER — HEALTH MAINTENANCE LETTER (OUTPATIENT)
Age: 53
End: 2023-01-14

## 2023-04-23 ENCOUNTER — HEALTH MAINTENANCE LETTER (OUTPATIENT)
Age: 53
End: 2023-04-23

## 2024-04-27 ENCOUNTER — HEALTH MAINTENANCE LETTER (OUTPATIENT)
Age: 54
End: 2024-04-27

## 2024-05-03 NOTE — ED NOTES
"Pt getting agitated stating, \" I want to fight\". Pt began swinging his fist and kicking his legs. Multiple staff members trying to keep pt in bed. Ketamine ordered by MD.   " Will add testosterone level onto labs today. Advised patient that I do not manage hormone replacement - also referred to [facility] to get established